# Patient Record
Sex: FEMALE | Race: WHITE | Employment: FULL TIME | URBAN - METROPOLITAN AREA
[De-identification: names, ages, dates, MRNs, and addresses within clinical notes are randomized per-mention and may not be internally consistent; named-entity substitution may affect disease eponyms.]

---

## 2022-05-16 LAB
ANTIBODY SCREEN OB: NEGATIVE
ANTIBODY SCREEN OB: NEGATIVE
HEPATITIS B SURFACE ANTIGEN OB: NEGATIVE
HEPATITIS B SURFACE ANTIGEN OB: NEGATIVE
HIV RESULT OB: NEGATIVE
HIV RESULT OB: NEGATIVE
RH FACTOR OB: POSITIVE
RH FACTOR OB: POSITIVE

## 2022-11-07 LAB
STREP GP B CULT OB: NEGATIVE
STREP GP B CULT OB: NEGATIVE

## 2022-11-16 ENCOUNTER — ULTRASOUND ENCOUNTER (OUTPATIENT)
Dept: PERINATAL CARE | Facility: HOSPITAL | Age: 32
End: 2022-11-16
Attending: OBSTETRICS & GYNECOLOGY
Payer: COMMERCIAL

## 2022-11-16 ENCOUNTER — ANESTHESIA (OUTPATIENT)
Dept: OBGYN UNIT | Facility: HOSPITAL | Age: 32
End: 2022-11-16
Payer: COMMERCIAL

## 2022-11-16 ENCOUNTER — APPOINTMENT (OUTPATIENT)
Dept: OBGYN CLINIC | Facility: HOSPITAL | Age: 32
End: 2022-11-16
Payer: COMMERCIAL

## 2022-11-16 ENCOUNTER — HOSPITAL ENCOUNTER (OUTPATIENT)
Facility: HOSPITAL | Age: 32
Discharge: HOME | End: 2022-11-16
Attending: OBSTETRICS & GYNECOLOGY | Admitting: OBSTETRICS & GYNECOLOGY
Payer: COMMERCIAL

## 2022-11-16 ENCOUNTER — ANESTHESIA EVENT (OUTPATIENT)
Dept: OBGYN UNIT | Facility: HOSPITAL | Age: 32
End: 2022-11-16
Payer: COMMERCIAL

## 2022-11-16 VITALS
BODY MASS INDEX: 24.49 KG/M2 | TEMPERATURE: 97 F | WEIGHT: 147 LBS | RESPIRATION RATE: 16 BRPM | HEIGHT: 65 IN | HEART RATE: 81 BPM | OXYGEN SATURATION: 98 % | SYSTOLIC BLOOD PRESSURE: 116 MMHG | DIASTOLIC BLOOD PRESSURE: 69 MMHG

## 2022-11-16 LAB
ERYTHROCYTE [DISTWIDTH] IN BLOOD BY AUTOMATED COUNT: 12.8 %
HCT VFR BLD AUTO: 39.2 %
HGB BLD-MCNC: 13.5 G/DL
HIV 1+2 AB+HIV1 P24 AG SERPL QL IA: NONREACTIVE
MCH RBC QN AUTO: 34.3 PG (ref 26–34)
MCHC RBC AUTO-ENTMCNC: 34.4 G/DL (ref 31–37)
MCV RBC AUTO: 99.5 FL
PLATELET # BLD AUTO: 200 10(3)UL (ref 150–450)
RBC # BLD AUTO: 3.94 X10(6)UL
T PALLIDUM AB SER QL IA: NONREACTIVE
WBC # BLD AUTO: 13.4 X10(3) UL (ref 4–11)

## 2022-11-16 PROCEDURE — 86780 TREPONEMA PALLIDUM: CPT | Performed by: OBSTETRICS & GYNECOLOGY

## 2022-11-16 PROCEDURE — 10S0XZZ REPOSITION PRODUCTS OF CONCEPTION, EXTERNAL APPROACH: ICD-10-PCS | Performed by: OBSTETRICS & GYNECOLOGY

## 2022-11-16 PROCEDURE — 85027 COMPLETE CBC AUTOMATED: CPT | Performed by: OBSTETRICS & GYNECOLOGY

## 2022-11-16 PROCEDURE — 96361 HYDRATE IV INFUSION ADD-ON: CPT

## 2022-11-16 PROCEDURE — 76815 OB US LIMITED FETUS(S): CPT | Performed by: OBSTETRICS & GYNECOLOGY

## 2022-11-16 PROCEDURE — 59025 FETAL NON-STRESS TEST: CPT

## 2022-11-16 PROCEDURE — 36415 COLL VENOUS BLD VENIPUNCTURE: CPT

## 2022-11-16 PROCEDURE — 96374 THER/PROPH/DIAG INJ IV PUSH: CPT

## 2022-11-16 PROCEDURE — 86701 HIV-1ANTIBODY: CPT | Performed by: OBSTETRICS & GYNECOLOGY

## 2022-11-16 PROCEDURE — 59412 ANTEPARTUM MANIPULATION: CPT

## 2022-11-16 PROCEDURE — 96372 THER/PROPH/DIAG INJ SC/IM: CPT

## 2022-11-16 RX ORDER — CHOLECALCIFEROL (VITAMIN D3) 25 MCG
1 TABLET,CHEWABLE ORAL DAILY
COMMUNITY

## 2022-11-16 RX ORDER — SODIUM CHLORIDE, SODIUM LACTATE, POTASSIUM CHLORIDE, CALCIUM CHLORIDE 600; 310; 30; 20 MG/100ML; MG/100ML; MG/100ML; MG/100ML
INJECTION, SOLUTION INTRAVENOUS CONTINUOUS
Status: DISCONTINUED | OUTPATIENT
Start: 2022-11-16 | End: 2022-11-16

## 2022-11-16 RX ORDER — TERBUTALINE SULFATE 1 MG/ML
0.25 INJECTION, SOLUTION SUBCUTANEOUS
Status: ACTIVE | OUTPATIENT
Start: 2022-11-16 | End: 2022-11-16

## 2022-11-16 RX ORDER — NALBUPHINE HCL 10 MG/ML
2.5 AMPUL (ML) INJECTION
Status: DISCONTINUED | OUTPATIENT
Start: 2022-11-16 | End: 2022-11-16

## 2022-11-16 RX ORDER — BUPIVACAINE HCL/0.9 % NACL/PF 0.25 %
5 PLASTIC BAG, INJECTION (ML) EPIDURAL AS NEEDED
Status: DISCONTINUED | OUTPATIENT
Start: 2022-11-16 | End: 2022-11-16

## 2022-11-16 RX ORDER — TERBUTALINE SULFATE 1 MG/ML
INJECTION, SOLUTION SUBCUTANEOUS
Status: COMPLETED
Start: 2022-11-16 | End: 2022-11-16

## 2022-11-16 NOTE — PROGRESS NOTES
Procedure note    Procedure: external cephalic version    Diagnosis breech presentation    Post procedure diagnosis cephalic presentation    Surgeon Dr. Jazmin Blackman MD    Assistant  Jocelyn Mathias    Epidural anesthesia was placed. Terbutaline was given subcutaneously. Patient was in the supine position. Upward pressure was used to move the baby up out of the pelvis while forward pressure moved the baby in a forward roll. 3 progressive attempts were made and successfully moved the baby to the phallic presentation. Cardiac activity was checked intermittently and noted to be a normal heart rate. Post procedure heart rate was in the 150s. Patient and fetus tolerated the procedure well.     Jazmin Blackman MD

## 2022-11-16 NOTE — DISCHARGE INSTRUCTIONS
Discharge Instructions    Diet: regular  Activity: Normal activity         General Instructions    Call your OB doctor if: Fluid leaking from your vagina; Decrease in fetal movement;Vaginal or rectal pressure;Vaginal bleeding;Uterine contractions 10 minutes or closer for 1 to 2 hours;Uterine contractions increasing in intensity and frequency; Temperature greater than 100F  Early labor comfort measures: Drink fluids and eat small light meals

## 2022-11-16 NOTE — PROGRESS NOTES
Patient was seen prior to the procedure and nst was reactive. Epidural was placed and subcutaneous terbutaline was given for one dose. She underwent the ECV with Dr. Kathie Stokes and myself assisting. Ultrasound was done intermittently to check position and heart tones. After the successful version, the patient was replaced on the monitor and will have monitoring for two hours. FHTs were wnl after the procedure and she was noted to have occasional contractions.

## 2022-11-16 NOTE — PROGRESS NOTES
Pt is a 33year old female admitted to 104/-A. Patient presents with:  External Version     Pt is  37w5d intra-uterine pregnancy. History obtained, consents signed. Oriented to room, staff, and plan of care.   Updated  on pt arrival and orders received for IV hydration and epidural

## 2022-11-16 NOTE — PROGRESS NOTES
[facility] ULTRASOUND REPORT   See imaging tab for complete consultation / ultrasound report   Ultrasound Findings:  Single IUP in breech presentation. Placenta is posterior. Cardiac activity is present at 129 bpm  MVP is 5.5 cm . ERIC 9.6 cm    Cardiac activity visualized after procedure. Fetus in cephalic presentation at end of procedure. Gila Mead MD      This was an ultrasound only encounter (no physician visit). The ultrasound was read by Dr. Twyla Nassar        Note to patient and family  The [facility] makes medical notes available to patients in the interest of transparency. However, please be advised that this is a medical document. It is intended as gyha-ea-oait communication. It is written and medical language may contain abbreviations or verbiage that are technical and unfamiliar. It may appear blunt or direct. Medical documents are intended to carry relevant information, facts as evident, and the clinical opinion of the practitioner.

## 2022-11-16 NOTE — ANESTHESIA PROCEDURE NOTES
Epidural Block    Date/Time: 11/16/2022 1:10 PM  Performed by: Jan Holm MD  Authorized by: Jan Holm MD     General Information and Staff    Start Time:  11/16/2022 1:10 PM  End Time:  11/16/2022 1:15 PM  Anesthesiologist:  Jan Holm MD  Performed by:   Anesthesiologist  Patient Location:  OB  Site Identification: surface landmarks    Reason for Block: at surgeon's request    Preanesthetic Checklist: patient identified, IV checked, risks and benefits discussed, surgical consent, monitors and equipment checked, pre-op evaluation, timeout performed, anesthesia consent and sterile technique used      Procedure Details    Patient Position:  Sitting  Prep: ChloraPrep    Monitoring:  Continuous pulse ox  Approach:  Midline  Location:  L 2-3  Injection Technique:  ALEXANDER air    Needle and Epidural Catheter    Needle Type:  Tuohy  Needle Gauge:  18 G  Needle Length:  3.5 in  ALEXANDER Depth:  4  Catheter Type:  End hole  Catheter Size:  20 G  Catheter at Skin Depth:  10  Test Dose:  Negative    Assessment    Sensory Level:  T6    Additional Comments     Test dose 3 mL 1.5% lido with Epi without sequela  Dosed with Lido PF 2% 5 mL + 5 mL + 3 mL  Fentanyl 100 mcg

## 2022-11-16 NOTE — PROGRESS NOTES
Pt walked to bathroom, voided, pericare done, abdominal binder applied, IV removed  epidural catheter removed, updated  orders received to discharge pt home with labor instruction and follow up for the next scheduled appointment with OB  Pt verbalizes discharge instruction, pt going home in stable condition, pt not in active labor on discharge.

## 2022-11-25 ENCOUNTER — HOSPITAL ENCOUNTER (OUTPATIENT)
Facility: HOSPITAL | Age: 32
Discharge: HOME | End: 2022-11-25
Attending: OBSTETRICS & GYNECOLOGY | Admitting: OBSTETRICS & GYNECOLOGY
Payer: COMMERCIAL

## 2022-11-25 ENCOUNTER — APPOINTMENT (OUTPATIENT)
Dept: ULTRASOUND IMAGING | Facility: HOSPITAL | Age: 32
End: 2022-11-25
Attending: OBSTETRICS & GYNECOLOGY
Payer: COMMERCIAL

## 2022-11-25 VITALS
BODY MASS INDEX: 24.16 KG/M2 | DIASTOLIC BLOOD PRESSURE: 75 MMHG | RESPIRATION RATE: 18 BRPM | TEMPERATURE: 98 F | WEIGHT: 145 LBS | HEIGHT: 65 IN | HEART RATE: 90 BPM | SYSTOLIC BLOOD PRESSURE: 120 MMHG

## 2022-11-25 PROCEDURE — 76815 OB US LIMITED FETUS(S): CPT | Performed by: OBSTETRICS & GYNECOLOGY

## 2022-11-25 PROCEDURE — 59025 FETAL NON-STRESS TEST: CPT

## 2022-11-25 PROCEDURE — 99213 OFFICE O/P EST LOW 20 MIN: CPT

## 2022-11-26 NOTE — NST
Nonstress Test   Patient: Melly Height    Gestation: 39w0d    NST:       Variability: Moderate           Accelerations: Yes           Decelerations: None            Baseline: 130 BPM           Uterine Irritability: No           Contractions: Irregular                                        Acoustic Stimulator: No           Nonstress Test Interpretation: Reactive                                 Additional Comments

## 2022-11-26 NOTE — DISCHARGE INSTRUCTIONS
Discharge Instructions    Diet: Regular diet/push fluids  Activity: Normal activity         General Instructions    Call your OB doctor if: Fluid leaking from your vagina; Decrease in fetal movement;Vaginal or rectal pressure;Uterine contractions 10 minutes or closer for 1 to 2 hours;Vaginal bleeding;Uterine contractions increasing in intensity and frequency; Temperature greater than 100F  Early labor comfort measures: Drink fluids and eat small light meals;Relax, sleep, take a warm bath or shower for 30 minutes or less; Take a walk

## 2022-11-26 NOTE — PROGRESS NOTES
Pt is a 33year old female admitted to TRG2/TRG2-A. Patient presents with:  R/o Labor: Patient reports UC every 5 mins x 2.5 hours. Denies LOF or bleeding.  + FM. Pt is  39w0d intra-uterine pregnancy. History obtained, consents signed. Oriented to room, staff, and plan of care.

## 2022-11-26 NOTE — PROGRESS NOTES
Patient up to walk x2 hours. Instructed not to leave labor and delivery. Patient will return to triage sooner with SROM, bleeding, or stronger more regular UC.

## 2022-12-01 ENCOUNTER — APPOINTMENT (OUTPATIENT)
Dept: OBGYN CLINIC | Facility: HOSPITAL | Age: 32
End: 2022-12-01
Payer: COMMERCIAL

## 2022-12-01 ENCOUNTER — HOSPITAL ENCOUNTER (INPATIENT)
Facility: HOSPITAL | Age: 32
LOS: 3 days | Discharge: HOME | End: 2022-12-04
Attending: OBSTETRICS & GYNECOLOGY | Admitting: OBSTETRICS & GYNECOLOGY
Payer: COMMERCIAL

## 2022-12-01 PROBLEM — Z34.90 PREGNANCY: Status: ACTIVE | Noted: 2022-12-01

## 2022-12-01 PROBLEM — Z34.90 PREGNANCY (HCC): Status: ACTIVE | Noted: 2022-12-01

## 2022-12-01 LAB
ANTIBODY SCREEN: NEGATIVE
BASOPHILS # BLD AUTO: 0.07 X10(3) UL (ref 0–0.2)
BASOPHILS NFR BLD AUTO: 0.6 %
EOSINOPHIL # BLD AUTO: 0.13 X10(3) UL (ref 0–0.7)
EOSINOPHIL NFR BLD AUTO: 1.1 %
ERYTHROCYTE [DISTWIDTH] IN BLOOD BY AUTOMATED COUNT: 12.8 %
HCT VFR BLD AUTO: 37.1 %
HGB BLD-MCNC: 12.8 G/DL
IMM GRANULOCYTES # BLD AUTO: 0.08 X10(3) UL (ref 0–1)
IMM GRANULOCYTES NFR BLD: 0.7 %
LYMPHOCYTES # BLD AUTO: 3.25 X10(3) UL (ref 1–4)
LYMPHOCYTES NFR BLD AUTO: 27.9 %
MCH RBC QN AUTO: 34 PG (ref 26–34)
MCHC RBC AUTO-ENTMCNC: 34.5 G/DL (ref 31–37)
MCV RBC AUTO: 98.4 FL
MONOCYTES # BLD AUTO: 0.91 X10(3) UL (ref 0.1–1)
MONOCYTES NFR BLD AUTO: 7.8 %
NEUTROPHILS # BLD AUTO: 7.21 X10 (3) UL (ref 1.5–7.7)
NEUTROPHILS # BLD AUTO: 7.21 X10(3) UL (ref 1.5–7.7)
NEUTROPHILS NFR BLD AUTO: 61.9 %
PLATELET # BLD AUTO: 214 10(3)UL (ref 150–450)
RBC # BLD AUTO: 3.77 X10(6)UL
RH BLOOD TYPE: POSITIVE
SARS-COV-2 RNA RESP QL NAA+PROBE: NOT DETECTED
T PALLIDUM AB SER QL IA: NONREACTIVE
WBC # BLD AUTO: 11.7 X10(3) UL (ref 4–11)

## 2022-12-01 PROCEDURE — 82728 ASSAY OF FERRITIN: CPT | Performed by: STUDENT IN AN ORGANIZED HEALTH CARE EDUCATION/TRAINING PROGRAM

## 2022-12-01 PROCEDURE — 85025 COMPLETE CBC W/AUTO DIFF WBC: CPT | Performed by: OBSTETRICS & GYNECOLOGY

## 2022-12-01 PROCEDURE — 86901 BLOOD TYPING SEROLOGIC RH(D): CPT | Performed by: OBSTETRICS & GYNECOLOGY

## 2022-12-01 PROCEDURE — 86780 TREPONEMA PALLIDUM: CPT | Performed by: OBSTETRICS & GYNECOLOGY

## 2022-12-01 PROCEDURE — 3E0DXGC INTRODUCTION OF OTHER THERAPEUTIC SUBSTANCE INTO MOUTH AND PHARYNX, EXTERNAL APPROACH: ICD-10-PCS | Performed by: STUDENT IN AN ORGANIZED HEALTH CARE EDUCATION/TRAINING PROGRAM

## 2022-12-01 PROCEDURE — 3E0P7VZ INTRODUCTION OF HORMONE INTO FEMALE REPRODUCTIVE, VIA NATURAL OR ARTIFICIAL OPENING: ICD-10-PCS | Performed by: STUDENT IN AN ORGANIZED HEALTH CARE EDUCATION/TRAINING PROGRAM

## 2022-12-01 PROCEDURE — 86850 RBC ANTIBODY SCREEN: CPT | Performed by: OBSTETRICS & GYNECOLOGY

## 2022-12-01 PROCEDURE — 86900 BLOOD TYPING SEROLOGIC ABO: CPT | Performed by: OBSTETRICS & GYNECOLOGY

## 2022-12-01 RX ORDER — HYDROMORPHONE HYDROCHLORIDE 1 MG/ML
1 INJECTION, SOLUTION INTRAMUSCULAR; INTRAVENOUS; SUBCUTANEOUS EVERY 2 HOUR PRN
Status: DISCONTINUED | OUTPATIENT
Start: 2022-12-01 | End: 2022-12-02

## 2022-12-01 RX ORDER — ZOLPIDEM TARTRATE 5 MG/1
5 TABLET ORAL NIGHTLY PRN
Status: DISCONTINUED | OUTPATIENT
Start: 2022-12-01 | End: 2022-12-02 | Stop reason: HOSPADM

## 2022-12-01 RX ORDER — DEXTROSE, SODIUM CHLORIDE, SODIUM LACTATE, POTASSIUM CHLORIDE, AND CALCIUM CHLORIDE 5; .6; .31; .03; .02 G/100ML; G/100ML; G/100ML; G/100ML; G/100ML
INJECTION, SOLUTION INTRAVENOUS AS NEEDED
Status: DISCONTINUED | OUTPATIENT
Start: 2022-12-01 | End: 2022-12-02 | Stop reason: HOSPADM

## 2022-12-01 RX ORDER — ONDANSETRON 2 MG/ML
4 INJECTION INTRAMUSCULAR; INTRAVENOUS EVERY 6 HOURS PRN
Status: DISCONTINUED | OUTPATIENT
Start: 2022-12-01 | End: 2022-12-02 | Stop reason: HOSPADM

## 2022-12-01 RX ORDER — TRISODIUM CITRATE DIHYDRATE AND CITRIC ACID MONOHYDRATE 500; 334 MG/5ML; MG/5ML
30 SOLUTION ORAL AS NEEDED
Status: DISCONTINUED | OUTPATIENT
Start: 2022-12-01 | End: 2022-12-02 | Stop reason: HOSPADM

## 2022-12-01 RX ORDER — SODIUM CHLORIDE, SODIUM LACTATE, POTASSIUM CHLORIDE, CALCIUM CHLORIDE 600; 310; 30; 20 MG/100ML; MG/100ML; MG/100ML; MG/100ML
INJECTION, SOLUTION INTRAVENOUS CONTINUOUS
Status: DISCONTINUED | OUTPATIENT
Start: 2022-12-01 | End: 2022-12-02 | Stop reason: HOSPADM

## 2022-12-01 RX ORDER — ACETAMINOPHEN 500 MG
500 TABLET ORAL EVERY 6 HOURS PRN
Status: DISCONTINUED | OUTPATIENT
Start: 2022-12-01 | End: 2022-12-02 | Stop reason: HOSPADM

## 2022-12-01 RX ORDER — AMMONIA INHALANTS 0.04 G/.3ML
0.3 INHALANT RESPIRATORY (INHALATION) AS NEEDED
Status: DISCONTINUED | OUTPATIENT
Start: 2022-12-01 | End: 2022-12-02 | Stop reason: HOSPADM

## 2022-12-01 RX ORDER — IBUPROFEN 600 MG/1
600 TABLET ORAL EVERY 6 HOURS PRN
Status: DISCONTINUED | OUTPATIENT
Start: 2022-12-01 | End: 2022-12-02 | Stop reason: HOSPADM

## 2022-12-01 RX ORDER — TERBUTALINE SULFATE 1 MG/ML
0.25 INJECTION, SOLUTION SUBCUTANEOUS AS NEEDED
Status: DISCONTINUED | OUTPATIENT
Start: 2022-12-01 | End: 2022-12-02 | Stop reason: HOSPADM

## 2022-12-02 ENCOUNTER — ANESTHESIA (OUTPATIENT)
Dept: OBGYN UNIT | Facility: HOSPITAL | Age: 32
End: 2022-12-02
Payer: COMMERCIAL

## 2022-12-02 ENCOUNTER — ANESTHESIA EVENT (OUTPATIENT)
Dept: OBGYN UNIT | Facility: HOSPITAL | Age: 32
End: 2022-12-02
Payer: COMMERCIAL

## 2022-12-02 PROCEDURE — 0KQM0ZZ REPAIR PERINEUM MUSCLE, OPEN APPROACH: ICD-10-PCS | Performed by: STUDENT IN AN ORGANIZED HEALTH CARE EDUCATION/TRAINING PROGRAM

## 2022-12-02 RX ORDER — ACETAMINOPHEN 500 MG
1000 TABLET ORAL EVERY 6 HOURS PRN
Status: DISCONTINUED | OUTPATIENT
Start: 2022-12-02 | End: 2022-12-04

## 2022-12-02 RX ORDER — BUPIVACAINE HCL/0.9 % NACL/PF 0.25 %
5 PLASTIC BAG, INJECTION (ML) EPIDURAL AS NEEDED
Status: DISCONTINUED | OUTPATIENT
Start: 2022-12-02 | End: 2022-12-02

## 2022-12-02 RX ORDER — SIMETHICONE 80 MG
80 TABLET,CHEWABLE ORAL 3 TIMES DAILY PRN
Status: DISCONTINUED | OUTPATIENT
Start: 2022-12-02 | End: 2022-12-04

## 2022-12-02 RX ORDER — LIDOCAINE HYDROCHLORIDE AND EPINEPHRINE 15; 5 MG/ML; UG/ML
INJECTION, SOLUTION EPIDURAL AS NEEDED
Status: DISCONTINUED | OUTPATIENT
Start: 2022-12-02 | End: 2022-12-02 | Stop reason: SURG

## 2022-12-02 RX ORDER — NALBUPHINE HCL 10 MG/ML
2.5 AMPUL (ML) INJECTION
Status: DISCONTINUED | OUTPATIENT
Start: 2022-12-02 | End: 2022-12-02

## 2022-12-02 RX ORDER — DOCUSATE SODIUM 100 MG/1
100 CAPSULE, LIQUID FILLED ORAL
Status: DISCONTINUED | OUTPATIENT
Start: 2022-12-02 | End: 2022-12-04

## 2022-12-02 RX ORDER — CARBOPROST TROMETHAMINE 250 UG/ML
INJECTION, SOLUTION INTRAMUSCULAR
Status: COMPLETED
Start: 2022-12-02 | End: 2022-12-02

## 2022-12-02 RX ORDER — METHYLERGONOVINE MALEATE 0.2 MG/ML
INJECTION INTRAVENOUS
Status: COMPLETED
Start: 2022-12-02 | End: 2022-12-02

## 2022-12-02 RX ORDER — MISOPROSTOL 200 UG/1
TABLET ORAL
Status: DISCONTINUED
Start: 2022-12-02 | End: 2022-12-02 | Stop reason: WASHOUT

## 2022-12-02 RX ORDER — ACETAMINOPHEN 500 MG
500 TABLET ORAL EVERY 6 HOURS PRN
Status: DISCONTINUED | OUTPATIENT
Start: 2022-12-02 | End: 2022-12-04

## 2022-12-02 RX ORDER — BISACODYL 10 MG
10 SUPPOSITORY, RECTAL RECTAL ONCE AS NEEDED
Status: DISCONTINUED | OUTPATIENT
Start: 2022-12-02 | End: 2022-12-04

## 2022-12-02 RX ORDER — IBUPROFEN 600 MG/1
600 TABLET ORAL EVERY 6 HOURS
Status: DISCONTINUED | OUTPATIENT
Start: 2022-12-02 | End: 2022-12-04

## 2022-12-02 RX ORDER — METHYLERGONOVINE MALEATE 0.2 MG/ML
0.2 INJECTION INTRAVENOUS ONCE
Status: COMPLETED | OUTPATIENT
Start: 2022-12-02 | End: 2022-12-02

## 2022-12-02 RX ADMIN — LIDOCAINE HYDROCHLORIDE AND EPINEPHRINE 5 ML: 15; 5 INJECTION, SOLUTION EPIDURAL at 03:00:00

## 2022-12-02 NOTE — L&D DELIVERY NOTE
Deepak Alvarez [[id number]]    Labor Events     labor?: No   steroids?: None  Cervical ripening type: Misoprostol  Antibiotics received during labor?: No  Antibiotics (enter # doses in comment): none  Rupture date/time: 2022     Rupture type: SROM  Fluid color: Clear  Induction: Misoprostol  Indications for induction: Elective  Augmentation: None  Intrapartum & labor complications: Variable decelerations     Labor Length    1st stage: 10h 35m  2nd stage: 0h 59m  3rd stage: 0h 06m     Labor Event Times    Labor onset date/time: 2022  Dilation complete date/time: 2022 1200  Start pushing date/time: 2022 1205      Presentation    Presentation: Vertex  Position: Left Occiput Anterior     Operative Delivery    Operative Vaginal Delivery: No            Shoulder Dystocia    Shoulder Dystocia: No     Anesthesia    Method: Epidural          [place] Delivery    Delivery date/time:  22 12:59:00   Delivery type: Normal spontaneous vaginal delivery    Details:     Delivery location: delivery room     Delivery Providers    Delivering Clinician: Quinn Simmons MD   Delivery personnel:  Provider Role   Williams Novoa, RN Baby Nurse   Samantha Mariscal, RN Delivery Nurse   Carlee Choudhary, RN Baby Nurse         Cord    Vessels: 3 Vessels  Complications: Nuchal  # of loops: 1  Timed cord clamping: Yes  Time in sec: 39  Cord blood disposition: to lab  Gases sent?: No     Resuscitation    Method: None     [place] Measurements    Weight: 3800 g 8 lb 6 oz Length: 50.8 cm   Head circum. : 35 cm Chest circum.: 33 cm      Abdominal circum.: 32 cm       Placenta    Date/time: 2022 1305  Removal: Expressed  Appearance: Intact  Disposition: held for future pathology     Apgars    Living status: Living   Apgar Scoring Key:    0 1 2    Skin color Blue or pale Acrocyanotic Completely pink    Heart rate Absent <100 bpm >100 bpm    Reflex irritability No response Grimace Cry or active withdrawal    Muscle tone Limp Some flexion Active motion    Respiratory effort Absent Weak cry; hypoventilation Good, crying              1 Minute:  5 Minute:  10 Minute:  15 Minute:  20 Minute:    Skin color: 0  1       Heart rate: 2  2       Reflex irritablity: 2  2       Muscle tone: 2  2       Respiratory effort: 2  2       Total: 8  9          Apgars assigned by: Priya Hdz RN  [place] disposition: with mother     Skin to Skin    Skin to skin initiated date/time: 2022 1315  Skin to skin with: Mother     Vaginal Count    Initial count RN: Yo Rabago RN  Initial count Tech: Gracia Benes   Sponges   Sharps    Initial counts 11   0    Final counts 11   2    Final count RN: Loralee Epley, RN  Final count MD: Adina Cantu MD     Delivery (Maternal)    Episiotomy: Left Mediolateral  Indications for episiotomy: Fetal Intolerance of Labor  Perineal lacerations: 2nd Repaired?: Yes   Vaginal laceration?: No    Cervical laceration?: No    Clitoral laceration?: No    Quantitative blood loss (mL): 470                                                                  Vaginal Delivery Note          Cade Wynn Patient Status:  Inpatient    1990 MRN [id number]   Location [address] [place] [address] Attending Orquidea Gray MD, MD   Hosp Day # 1 PCP No primary care provider on file. Preprocedure Dx:  IUP at 40w0d, s/p successful external cephalic version at 46B0B, elective induction of labor    Post Procedure Dx: Same - delivered    Procedure: Normal Spontaneous Vaginal Delivery    Physician: Felipe Pelaez MD    Anesthesia: Epidural and local    QBL: 470cc    Findings:   1) A viable male infant with Apgars of 8 and 9 weighing 8lbs 6 oz was delivered in ROBYN position with a loose nuchal cord. 2) 3 vessel cord. 3) Normal appearing placenta spontaneously delivered intact.    4) small LML episiotomy cut due to low fetal heart rate degree laceration    Procedure:  Patient is a 32 y/o  who presented at 39w6d gestation for elective induction of labor. Patient was admitted to labor and delivery. Misoprostol was given for cervical ripening. SROM occurred at 0125 with clear fluid. Epidural was placed per patient request, and she was managed expectantly after that. Intermittent prolonged and late decelerations were noted. The fetal tracing improved with positioning. Her labor progressed, and upon complete dilation she had a strong urge to push. The patient pushed for less than 1 hour. With pushing the fetal heart rate dropped to 70s to 90s. When the fetal heart rate did not return above 100bpm between pushes episiotomy was discussed. The patient consented to episiotomy. LML episiotomy was cut approximately 1.5 cm in length distal to the hymenal ring, and the fetal head delivered with the following two pushes. As the head was delivered, the perineum was supported to decrease the risk of further tearing. The infant was delivered in the ROBYN position with a loose nuchal cord around the back of the neck only. The shoulders rotated easily, and the anterior shoulder delivered easily followed by the posterior shoulder and remainder of the infant. The infant's mouth and nose were bulb suctioned. The cord was doubly clamped and cut after a 30 second delay. The baby was placed on the mother's abdomen vigorous and crying. The cord blood was collected, and the placenta spontaneously delivered intact shortly thereafter. Bimanual exam was performed to the fundus. Retained products were not palpated. The lower uterine segment was atonic. Methergine and pitocin were given with improve tone. Examination of the cervix, vagina, and perineum demonstrated a 2nd degree laceration. The vaginal portion of the laceration was repaired using 2-0 vicryl in a running locked fashion from the apex of the tear to the level of the hymenal ring.   A deep crown stitch was placed, and the perineal body was re-approximated using 2-0 vicryl in an running fashion. The skin was re-approximated using 2-0 vicryl in a subcuticular fashion. Bleeding was minimal. The patient was then moved to the supine position in stable condition. Counts were correct.     Complications:  None    Russ Kaur MD  12/2/2022  1:40 PM

## 2022-12-02 NOTE — PLAN OF CARE
Problem: SAFETY ADULT - FALL  Goal: Free from fall injury  Description: INTERVENTIONS:  - Assess pt frequently for physical needs  - Identify cognitive and physical deficits and behaviors that affect risk of falls.   - [place] fall precautions as indicated by assessment.  - Educate pt/family on patient safety including physical limitations  - Instruct pt to call for assistance with activity based on assessment  - Modify environment to reduce risk of injury  - Provide assistive devices as appropriate  - Consider OT/PT consult to assist with strengthening/mobility  - Encourage toileting schedule  2022 1302 by Larisa Baird RN  Outcome: Progressing  2022 07 by Larisa Baird RN  Outcome: Progressing     Problem: BIRTH - VAGINAL/ SECTION  Goal: Fetal and maternal status remain reassuring during the birth process  Description: INTERVENTIONS:  - Monitor vital signs  - Monitor fetal heart rate  - Monitor uterine activity  - Monitor labor progression (vaginal delivery)  - DVT prophylaxis (C/S delivery)  - Surgical antibiotic prophylaxis (C/S delivery)  2022 1302 by Larisa Baird RN  Outcome: Completed  2022 by Larisa Baird RN  Outcome: Progressing     Problem: PAIN - ADULT  Goal: Verbalizes/displays adequate comfort level or patient's stated pain goal  Description: INTERVENTIONS:  - Encourage pt to monitor pain and request assistance  - Assess pain using appropriate pain scale  - Administer analgesics based on type and severity of pain and evaluate response  - Implement non-pharmacological measures as appropriate and evaluate response  - Consider cultural and social influences on pain and pain management  - Manage/alleviate anxiety  - Utilize distraction and/or relaxation techniques  - Monitor for opioid side effects  - Notify MD/LIP if interventions unsuccessful or patient reports new pain  - Anticipate increased pain with activity and pre-medicate as appropriate  2022 1302 by Dariana Morris RN  Outcome: Completed  12/2/2022 0738 by Dariana Morris RN  Outcome: Progressing     Problem: ANXIETY  Goal: Will report anxiety at manageable levels  Description: INTERVENTIONS:  - Administer medication as ordered  - Teach and rehearse alternative coping skills  - Provide emotional support with 1:1 interaction with staff  12/2/2022 1302 by Dariana Morris RN  Outcome: Completed  12/2/2022 0738 by Dariana Morris RN  Outcome: Progressing     Problem: Patient/Family Goals  Goal: Patient/Family Long Term Goal  Description: Patient's Long Term Goal: uncomplicated delivery     Interventions:  -   - See additional Care Plan goals for specific interventions  12/2/2022 1302 by Dariana Morris RN  Outcome: Completed  12/2/2022 0738 by Dariana Morris RN  Outcome: Progressing  Goal: Patient/Family Short Term Goal  Description: Patient's Short Term Goal: pain control     Interventions:   -   - See additional Care Plan goals for specific interventions  12/2/2022 1302 by Dariana Morris RN  Outcome: Completed  12/2/2022 0738 by Dariana Morris RN  Outcome: Progressing

## 2022-12-02 NOTE — ANESTHESIA PROCEDURE NOTES
Labor Analgesia    Date/Time: 12/2/2022 3:00 AM  Performed by: Cande Frank MD  Authorized by: Cande Frank MD       General Information and Staff    Start Time:  12/2/2022 3:00 AM  End Time:  12/2/2022 3:07 AM  Anesthesiologist:  Cande Frank MD  Performed by:   Anesthesiologist  Patient Location:  OB  Site Identification: surface landmarks    Reason for Block: labor epidural    Preanesthetic Checklist: patient identified, IV checked, risks and benefits discussed, monitors and equipment checked, pre-op evaluation, timeout performed, IV bolus, anesthesia consent and sterile technique used      Procedure Details    Patient Position:  Sitting  Prep: ChloraPrep    Monitoring:  Heart rate and continuous pulse ox  Approach:  Midline    Epidural Needle    Injection Technique:  ALEXANDER saline  Needle Type:  Tuohy  Needle Gauge:  17 G  Needle Length:  3.375 in  Needle Insertion Depth:  4  Location:  L3-4    Spinal Needle      Catheter    Catheter Type:  End hole  Catheter Size:  19 G  Catheter at Skin Depth:  12  Test Dose:  Negative    Assessment    Sensory Level:  T6    Additional Comments     Test Dose Given at 0307 am

## 2022-12-02 NOTE — PROGRESS NOTES
Pt is a 33year old female admitted to 103/103-A, No chief complaint on file. Pt here for elective IOL     Pt is 39w6d intra-uterine pregnancy. Denies any leaking of fluid. Reports +fetal movement. History obtained, consents signed. Oriented to room, staff, and plan of care.

## 2022-12-02 NOTE — PROGRESS NOTES
Fetal tracing evaluated due to minimal variability and periods of fetal heart rate decelerations. I spoke  I examined her and she is 5-6/80/-1  After exam there was acceleration of FHR to the 160s, baseline has been in the 150s. Patient was repositioned to sitting up and variability improved to moderate with periods of accelerations. Will continue careful observation at this time. Plan discussed with the patient and .

## 2022-12-02 NOTE — PROGRESS NOTES
Pt admitted to room 1110 in stable condition. HUGS/KISSES in place. ID bands verified with Bi Yu RN. Educational materials reviewed and placed at bedside. All questions answered. Call light within reach.

## 2022-12-02 NOTE — PROGRESS NOTES
Patient up to bathroom with assist x 2. Voided 800 at this time. Patient transferred to mother/baby room 1110 per wheelchair in stable condition with baby and personal belongings. Accompanied by significant other and staff. Report given to mother/baby RN.

## 2022-12-02 NOTE — PLAN OF CARE
Problem: BIRTH - VAGINAL/ SECTION  Goal: Fetal and maternal status remain reassuring during the birth process  Description: INTERVENTIONS:  - Monitor vital signs  - Monitor fetal heart rate  - Monitor uterine activity  - Monitor labor progression (vaginal delivery)  - DVT prophylaxis (C/S delivery)  - Surgical antibiotic prophylaxis (C/S delivery)  Outcome: Progressing     Problem: PAIN - ADULT  Goal: Verbalizes/displays adequate comfort level or patient's stated pain goal  Description: INTERVENTIONS:  - Encourage pt to monitor pain and request assistance  - Assess pain using appropriate pain scale  - Administer analgesics based on type and severity of pain and evaluate response  - Implement non-pharmacological measures as appropriate and evaluate response  - Consider cultural and social influences on pain and pain management  - Manage/alleviate anxiety  - Utilize distraction and/or relaxation techniques  - Monitor for opioid side effects  - Notify MD/LIP if interventions unsuccessful or patient reports new pain  - Anticipate increased pain with activity and pre-medicate as appropriate  Outcome: Progressing     Problem: ANXIETY  Goal: Will report anxiety at manageable levels  Description: INTERVENTIONS:  - Administer medication as ordered  - Teach and rehearse alternative coping skills  - Provide emotional support with 1:1 interaction with staff  Outcome: Progressing     Problem: SAFETY ADULT - FALL  Goal: Free from fall injury  Description: INTERVENTIONS:  - Assess pt frequently for physical needs  - Identify cognitive and physical deficits and behaviors that affect risk of falls.   - [place] fall precautions as indicated by assessment.  - Educate pt/family on patient safety including physical limitations  - Instruct pt to call for assistance with activity based on assessment  - Modify environment to reduce risk of injury  - Provide assistive devices as appropriate  - Consider OT/PT consult to assist with strengthening/mobility  - Encourage toileting schedule  Outcome: Progressing     Problem: Patient/Family Goals  Goal: Patient/Family Long Term Goal  Description: Patient's Long Term Goal: uncomplicated delivery     Interventions:  -   - See additional Care Plan goals for specific interventions  Outcome: Progressing  Goal: Patient/Family Short Term Goal  Description: Patient's Short Term Goal: pain control     Interventions:   -   - See additional Care Plan goals for specific interventions  Outcome: Progressing

## 2022-12-03 PROBLEM — Z34.90 PREGNANCY (HCC): Status: RESOLVED | Noted: 2022-12-01 | Resolved: 2022-12-03

## 2022-12-03 PROBLEM — Z34.90 PREGNANCY: Status: RESOLVED | Noted: 2022-12-01 | Resolved: 2022-12-03

## 2022-12-03 LAB
BASOPHILS # BLD AUTO: 0.09 X10(3) UL (ref 0–0.2)
BASOPHILS NFR BLD AUTO: 0.5 %
DEPRECATED HBV CORE AB SER IA-ACNC: 21.9 NG/ML
EOSINOPHIL # BLD AUTO: 0.09 X10(3) UL (ref 0–0.7)
EOSINOPHIL NFR BLD AUTO: 0.5 %
ERYTHROCYTE [DISTWIDTH] IN BLOOD BY AUTOMATED COUNT: 13.1 %
HCT VFR BLD AUTO: 31.3 %
HGB BLD-MCNC: 10.6 G/DL
IMM GRANULOCYTES # BLD AUTO: 0.12 X10(3) UL (ref 0–1)
IMM GRANULOCYTES NFR BLD: 0.7 %
LYMPHOCYTES # BLD AUTO: 2.67 X10(3) UL (ref 1–4)
LYMPHOCYTES NFR BLD AUTO: 16 %
MCH RBC QN AUTO: 33.7 PG (ref 26–34)
MCHC RBC AUTO-ENTMCNC: 33.9 G/DL (ref 31–37)
MCV RBC AUTO: 99.4 FL
MONOCYTES # BLD AUTO: 0.99 X10(3) UL (ref 0.1–1)
MONOCYTES NFR BLD AUTO: 5.9 %
NEUTROPHILS # BLD AUTO: 12.72 X10 (3) UL (ref 1.5–7.7)
NEUTROPHILS # BLD AUTO: 12.72 X10(3) UL (ref 1.5–7.7)
NEUTROPHILS NFR BLD AUTO: 76.4 %
PLATELET # BLD AUTO: 163 10(3)UL (ref 150–450)
RBC # BLD AUTO: 3.15 X10(6)UL
WBC # BLD AUTO: 16.7 X10(3) UL (ref 4–11)

## 2022-12-03 PROCEDURE — 85025 COMPLETE CBC W/AUTO DIFF WBC: CPT | Performed by: STUDENT IN AN ORGANIZED HEALTH CARE EDUCATION/TRAINING PROGRAM

## 2022-12-03 NOTE — PLAN OF CARE
Problem: SAFETY ADULT - FALL  Goal: Free from fall injury  Description: INTERVENTIONS:  - Assess pt frequently for physical needs  - Identify cognitive and physical deficits and behaviors that affect risk of falls. - [place] fall precautions as indicated by assessment.  - Educate pt/family on patient safety including physical limitations  - Instruct pt to call for assistance with activity based on assessment  - Modify environment to reduce risk of injury  - Provide assistive devices as appropriate  - Consider OT/PT consult to assist with strengthening/mobility  - Encourage toileting schedule  Outcome: Progressing     Problem: POSTPARTUM  Goal: Long Term Goal:Experiences normal postpartum course  Description: INTERVENTIONS:  - Assess and monitor vital signs and lab values. - Assess fundus and lochia. - Provide ice/sitz baths for perineum discomfort. - Monitor healing of incision/episiotomy/laceration, and assess for signs and symptoms of infection and hematoma. - Assess bladder function and monitor for bladder distention.  - Provide/instruct/assist with pericare as needed. - Provide VTE prophylaxis as needed. - Monitor bowel function.  - Encourage ambulation and provide assistance as needed. - Assess and monitor emotional status and provide social service/psych resources as needed. - Utilize standard precautions and use personal protective equipment as indicated. Ensure aseptic care of all intravenous lines and invasive tubes/drains.  - Obtain immunization and exposure to communicable diseases history. Outcome: Progressing  Goal: Optimize infant feeding at the breast  Description: INTERVENTIONS:  - Initiate breast feeding within first hour after birth. - Monitor effectiveness of current breast feeding efforts. - Assess support systems available to mother/family.  - Identify cultural beliefs/practices regarding lactation, letdown techniques, maternal food preferences.   - Assess mother's knowledge and previous experience with breast feeding.  - Provide information as needed about early infant feeding cues (e.g., rooting, lip smacking, sucking fingers/hand) versus late cue of crying.  - Discuss/demonstrate breast feeding aids (e.g., infant sling, nursing footstool/pillows, and breast pumps). - Encourage mother/other family members to express feelings/concerns, and actively listen. - Educate father/SO about benefits of breast feeding and how to manage common lactation challenges. - Recommend avoidance of specific medications or substances incompatible with breast feeding.  - Assess and monitor for signs of nipple pain/trauma. - Instruct and provide assistance with proper latch. - Review techniques for milk expression (breast pumping) and storage of breast milk. Provide pumping equipment/supplies, instructions and assistance, as needed. - Encourage rooming-in and breast feeding on demand.  - Encourage skin-to-skin contact. - Provide LC support as needed. - Assess for and manage engorgement. - Provide breast feeding education handouts and information on community breast feeding support. Outcome: Progressing  Goal: Establishment of adequate milk supply with medication/procedure interruptions  Description: INTERVENTIONS:  - Review techniques for milk expression (breast pumping). - Provide pumping equipment/supplies, instructions, and assistance until it is safe to breastfeed infant. Outcome: Progressing  Goal: Appropriate maternal -  bonding  Description: INTERVENTIONS:  - Assess caregiver- interactions. - Assess caregiver's emotional status and coping mechanisms. - Encourage caregiver to participate in  daily care. - Assess support systems available to mother/family.  - Provide /case management support as needed.   Outcome: Progressing

## 2022-12-04 VITALS
SYSTOLIC BLOOD PRESSURE: 119 MMHG | BODY MASS INDEX: 24.99 KG/M2 | WEIGHT: 150 LBS | HEIGHT: 65 IN | DIASTOLIC BLOOD PRESSURE: 83 MMHG | HEART RATE: 63 BPM | OXYGEN SATURATION: 99 % | TEMPERATURE: 99 F | RESPIRATION RATE: 18 BRPM

## 2022-12-04 NOTE — DISCHARGE SUMMARY
Obstetrical Discharge Summary    Patient Name:  Davonte Guevara  MRN:                 [id number]  YOB: 1990    Primary OB Clinician: Yung Parson 13    Admission Date: 2022    Discharge Date:  2022    Admission Diagnosis: 28yo  at 39w6d here for elective induction of labor    Discharge Diagnoses: 33year old  s/p vaginal delivery    Antepartum complications:   Breech presentation s/p successful external cephalic version    Intrapartum complications: None    Delivery: spontaneous vaginal delivery at 40w0d      Baby: [address] [place] [address] Course: The patient was admitted to L&D on 22 for cervical ripening. Her labor progressed spontaneously after Misoprostol was started. Epidural was placed for pain management per patient's request. The patient progressed well through her labor. On 22 at 1259, pt underwent uneventful spontaneous vaginal delivery with viable male infant. Apgars 8/9, weight 8 # 6 oz. Please see delivery note for details. Overall, pt did well in post-partum period. On postpartum day 1, patient was doing well. Pain was well-controlled. Lochia decreasing. She was tolerating a general diet. She was breast feeding baby. Vital signs were stable. Physical exam was within normal limits. The patient continued to meet postpartum expectations. Pt was discharged home on post-partum day 2 on 2022. Recent Labs:  Recent Labs   Lab 22  0755   RBC 3.77* 3.15*   HGB 12.8 10.6*   HCT 37.1 31.3*   MCV 98.4 99.4   MCH 34.0 33.7   MCHC 34.5 33.9   RDW 12.8 13.1   NEPRELIM 7.21 12.72*   WBC 11.7* 16.7*   .0 163.0       Discharge condition:  D/C'ed home in stable condition    Discharge diet:  general    Discharge medications:       Medication List      CONTINUE taking these medications    prenatal vitamin with DHA 27-0.8-228 MG Caps     UNISOM SLEEPTABS OR              Follow up:  Follow-up in the office in 6 weeks. Activity Restrictions:  Nothing per vagina for 6 weeks (no sex, tampons or douching)    Pt understood all instructions and was given copy on discharge home.      Junior Prasad MD  12/4/2022

## 2022-12-04 NOTE — PLAN OF CARE
Problem: SAFETY ADULT - FALL  Goal: Free from fall injury  Description: INTERVENTIONS:  - Assess pt frequently for physical needs  - Identify cognitive and physical deficits and behaviors that affect risk of falls. - [place] fall precautions as indicated by assessment.  - Educate pt/family on patient safety including physical limitations  - Instruct pt to call for assistance with activity based on assessment  - Modify environment to reduce risk of injury  - Provide assistive devices as appropriate  - Consider OT/PT consult to assist with strengthening/mobility  - Encourage toileting schedule  Outcome: Completed     Problem: POSTPARTUM  Goal: Long Term Goal:Experiences normal postpartum course  Description: INTERVENTIONS:  - Assess and monitor vital signs and lab values. - Assess fundus and lochia. - Provide ice/sitz baths for perineum discomfort. - Monitor healing of incision/episiotomy/laceration, and assess for signs and symptoms of infection and hematoma. - Assess bladder function and monitor for bladder distention.  - Provide/instruct/assist with pericare as needed. - Provide VTE prophylaxis as needed. - Monitor bowel function.  - Encourage ambulation and provide assistance as needed. - Assess and monitor emotional status and provide social service/psych resources as needed. - Utilize standard precautions and use personal protective equipment as indicated. Ensure aseptic care of all intravenous lines and invasive tubes/drains.  - Obtain immunization and exposure to communicable diseases history. Outcome: Completed  Goal: Optimize infant feeding at the breast  Description: INTERVENTIONS:  - Initiate breast feeding within first hour after birth. - Monitor effectiveness of current breast feeding efforts. - Assess support systems available to mother/family.  - Identify cultural beliefs/practices regarding lactation, letdown techniques, maternal food preferences.   - Assess mother's knowledge and previous experience with breast feeding.  - Provide information as needed about early infant feeding cues (e.g., rooting, lip smacking, sucking fingers/hand) versus late cue of crying.  - Discuss/demonstrate breast feeding aids (e.g., infant sling, nursing footstool/pillows, and breast pumps). - Encourage mother/other family members to express feelings/concerns, and actively listen. - Educate father/SO about benefits of breast feeding and how to manage common lactation challenges. - Recommend avoidance of specific medications or substances incompatible with breast feeding.  - Assess and monitor for signs of nipple pain/trauma. - Instruct and provide assistance with proper latch. - Review techniques for milk expression (breast pumping) and storage of breast milk. Provide pumping equipment/supplies, instructions and assistance, as needed. - Encourage rooming-in and breast feeding on demand.  - Encourage skin-to-skin contact. - Provide LC support as needed. - Assess for and manage engorgement. - Provide breast feeding education handouts and information on community breast feeding support. Outcome: Completed  Goal: Establishment of adequate milk supply with medication/procedure interruptions  Description: INTERVENTIONS:  - Review techniques for milk expression (breast pumping). - Provide pumping equipment/supplies, instructions, and assistance until it is safe to breastfeed infant. Outcome: Completed  Goal: Appropriate maternal -  bonding  Description: INTERVENTIONS:  - Assess caregiver- interactions. - Assess caregiver's emotional status and coping mechanisms. - Encourage caregiver to participate in  daily care. - Assess support systems available to mother/family.  - Provide /case management support as needed.   Outcome: Completed

## 2022-12-04 NOTE — PROGRESS NOTES
NURSING DISCHARGE NOTE    Discharged Home via Ambulatory. Accompanied by Spouse  Belongings Taken by patient/family Verbalized good understanding of discharge instructions.

## 2022-12-06 ENCOUNTER — TELEPHONE (OUTPATIENT)
Dept: OBGYN UNIT | Facility: HOSPITAL | Age: 32
End: 2022-12-06

## 2022-12-07 ENCOUNTER — TELEPHONE (OUTPATIENT)
Dept: OBGYN UNIT | Facility: HOSPITAL | Age: 32
End: 2022-12-07

## 2022-12-08 ENCOUNTER — NURSE ONLY (OUTPATIENT)
Dept: LACTATION | Facility: HOSPITAL | Age: 32
End: 2022-12-08
Attending: OBSTETRICS & GYNECOLOGY
Payer: COMMERCIAL

## 2022-12-08 PROCEDURE — 99213 OFFICE O/P EST LOW 20 MIN: CPT

## 2022-12-20 ENCOUNTER — NURSE ONLY (OUTPATIENT)
Dept: LACTATION | Facility: HOSPITAL | Age: 32
End: 2022-12-20
Attending: OBSTETRICS & GYNECOLOGY
Payer: COMMERCIAL

## 2022-12-20 PROCEDURE — 99212 OFFICE O/P EST SF 10 MIN: CPT

## 2023-03-06 ENCOUNTER — NURSE ONLY (OUTPATIENT)
Dept: LACTATION | Facility: HOSPITAL | Age: 33
End: 2023-03-06
Attending: STUDENT IN AN ORGANIZED HEALTH CARE EDUCATION/TRAINING PROGRAM
Payer: COMMERCIAL

## 2023-03-06 PROCEDURE — 99212 OFFICE O/P EST SF 10 MIN: CPT

## 2023-04-03 ENCOUNTER — ORDER TRANSCRIPTION (OUTPATIENT)
Dept: PHYSICAL THERAPY | Facility: HOSPITAL | Age: 33
End: 2023-04-03

## 2023-04-03 DIAGNOSIS — N94.10 DYSPAREUNIA, FEMALE: Primary | ICD-10-CM

## 2023-05-17 ENCOUNTER — TELEPHONE (OUTPATIENT)
Dept: PHYSICAL THERAPY | Facility: HOSPITAL | Age: 33
End: 2023-05-17

## 2023-05-19 ENCOUNTER — OFFICE VISIT (OUTPATIENT)
Dept: PHYSICAL THERAPY | Facility: HOSPITAL | Age: 33
End: 2023-05-19
Attending: STUDENT IN AN ORGANIZED HEALTH CARE EDUCATION/TRAINING PROGRAM
Payer: COMMERCIAL

## 2023-05-19 DIAGNOSIS — N94.10 DYSPAREUNIA, FEMALE: ICD-10-CM

## 2023-05-19 PROCEDURE — 97112 NEUROMUSCULAR REEDUCATION: CPT

## 2023-05-19 PROCEDURE — 97140 MANUAL THERAPY 1/> REGIONS: CPT

## 2023-05-19 PROCEDURE — 97161 PT EVAL LOW COMPLEX 20 MIN: CPT

## 2023-05-19 NOTE — PROGRESS NOTES
MUSCULOSKELETAL AND PELVIC FLOOR EVALUATION:     Diagnosis:   Dyspareunia, female (N94.10)   Referring Provider: Scar Strong  Date of Evaluation:    2023    Precautions:  None Next MD visit:   none scheduled  Date of Surgery: n/a     PATIENT SUMMARY   Mary Jane Childress is a 33year old female who presents to therapy today with complaints of pain with intercourse after giving birth to her son ~6 months ago. She tried to return to intercourse ~6 weeks after giving birth, and initially, pain was too intense to be able to continue. That has since improved, but she still has notable pain that reaches 4-5/10. Pain is most notable in the deeper layers of muscle initially and then it gradually dissipates. Pain quality described as \"raw. \" She did sustain 2nd degree tearing with childbirth including a urethral tear. She did also experience pain during a gynecological exam with a speculum recently. She denies constipation, urinary issues, or other PFM dysfunction. She does walk ~4 miles per day. Current symptoms include: vaginal pain    Pt describes pain level: current 0/10, best 0/10, worst 6-7/10 initially but has now reduced to 4-5/10. Quality: intermittent and sharp    Pregnant Now: No  Obstetrical/Gynecological history: : 1  Para: 1  Delivery method: vaginal  Complications in deliveries: 2nd degree tearing  Occupation/Activities: [employer]    PFDI-20: 17.71/300; Impairment= 5.9 %    Pritesh Contreras describes prior level of function as pain free with no limitations prior to childbirth. Pt goals include to resolve pain with intercourse. Past medical history was reviewed with Pritesh Contreras. Significant findings include  has a past medical history of ADD (attention deficit disorder).       URINARY HABITS  WNL    BOWEL HABITS  WNL    SEXUAL HEALTH STATUS  Marinoff Scale: 2  History of Sexual Abuse: No  Sexual [place] Status: Active  Pain with initial and/or deep penetration: Both  Pain with pelvic exam/tampon use: Yes    Juana Lopez presents to physical therapy evaluation with primary c/o pain with intercourse. The results of the objective tests and measures show mildly increased abdominal tension, chest dominant breathing pattern, presence of perineal scar tissue, and increased tension/pain in pelvic floor musculature  Functional deficits include but are not limited to impaired ability to tolerate a gynecological exam or intercourse due to tension and pain. Signs and symptoms are consistent with diagnosis of post-partum dyspareunia. Pt and PT discussed evaluation findings, pathology, POC and HEP. Pt voiced understanding and performs HEP correctly without reported pain. Skilled Pelvic Physical Therapy is medically necessary to address the above impairments and reach functional goals. OBJECTIVE:   Posture: Slight fwd head/rounded shoulders  Pelvic Alignment: Unremarkable  Deep Tendon Reflexes:  NT today  Gait: pt ambulates on level ground with normal mechanics. External Palpation: Mildly increased tension in lower abdomen  Scars (location/surgery): NA  Diastasis Recti: NT today    Range Of Motion  Lumbar AROM screen: WNL  LE AROM screen: grossly WNL     Strength (MMT) 5/5 RYDER LE except 3+/5 hip abductors  Transverse Abdominis: 4-/5    Flexibility Summary: WNL RYDER LE except mild deficits in adductors       Informed consent for internal pelvic evaluation given: Yes    External Observation:   Voluntary contraction: present   Voluntary relaxation: present but diminished  Involuntary contraction: present  Involuntary relaxation: absent    Mons pubis: WNL  Labia majora: WNL  Labia minora:  WNL  Urethral meatus: WNL  Introitus: WNL  Perineal body: other: increased tension/scar tissue      Internal Examination   Scar: scar tissue present at posterior introitus, mild TTP    Pelvic Floor Muscle strength: grossly 3+/5  External Anal Sphincter: NT  Accessory Muscle Use: none    Tissue Laxity Test:  Anterior Wall: WNL  Posterior Wall: WNL  Apical: WNL    Eccentric lengthening contraction: Impaired  Bearing down Valsalva maneuver (2-3x): WNL    Breathing pattern: chest dominant, decreased abdominal/thoracic expansion    Internal Palpation: WNL except Bulbocavernosus L>R minimal restriction and tenderness  Ischiocavernosus RYDER WNL  Deep Transverse Perineal RYDER minimal restriction  Compress Urethra/Sphincter R>L moderate restriction  Pubococcygeus/Pubovaginalis R>Lmoderate restriction and tenderness  Iliococcygeus R>L moderate restriction and tenderness  Coccygeus R>L moderate restriction and tenderness  Obturator Internus RYDER minimal restriction    Today's Treatment and Response:   Patient education was provided on objective findings of external and internal evaluation and expectations with treatment outcomes. Educated on pelvic anatomy and function with diagrams and pelvic model and diaphragmatic breathing for PNS activation and pelvic floor relaxation     Neuro:   Discussed PFM/pelvic organ anatomy and physiology, as well as physiological changes with pregnancy and childbirth. Discussed psychosocial aspects of dyspareunia and utility of down-training PFM via diaphragmatic breathing and lumbopelvic stretching exercise. Cued in diaphragmatic breathing in various positions with emphasis on pelvic floor elongation/relaxation. Also discussed dilator/pelvic wand use to alleviate PFM tension (encouraged patient to wait on purchasing set for now).  25 mins  Access Code: Aly Richey  Date: 05/19/2023  - Supine Pelvic Floor Stretch  - 1 x daily - 5 x weekly - 2 sets - 10 reps  - Diaphragmatic Breathing in Child's Pose with Pelvic Floor Relaxation  - 1 x daily - 5 x weekly - 2 sets - 10 reps  - Deep Squat with Pelvic Floor Relaxation  - 1 x daily - 5 x weekly - 10 reps  - Quadruped Diaphragmatic Breathing  - 1 x daily - 5 x weekly - 2 sets - 10 reps    Manual:   Internal assessment performed and cues provided on proper PFM contraction, relaxation, and bearing down. Brief TrP release applied to deep PFM layer on R, superficial PFM layer on L and perineal body. 15 mins        Charges: PT Eval Low Complexity, neuro x 2, manual x 1      Total Timed Treatment: 40 min     Total Treatment Time: 55 min     Based on clinical rationale and outcome measures, this evaluation involved Low Complexity decision making. PLAN OF CARE:    Goals: (to be met in 10 visits)  Patient will demonstrate optimal PFM elongation with coordinated inhalation x 10 reps to alleviate PFM pain and muscle tension. Patient will demonstrate normalized tone and minimal pain onset (</= 2/10) with internal assessment for increased tolerance to gynecological exam.   Patient will demonstrate optimal postural awareness without required cues for improved PFM excursion with diaphragmatic breathing. Patient will report adherence to HEP for continued exercise benefits following cessation of PT. Frequency / Duration: Patient will be seen for 1-2 x/week or a total of 10 visits over a 90 day period. Treatment will include: Manual Therapy, Neuromuscular Re-education, Self-Care Home Management, Therapeutic Activities, Therapeutic Exercise and Home Exercise Program instruction     Education or treatment limitation: None  Rehab Potential:excellent      Patient/Family/Caregiver was advised of these findings, precautions, and treatment options and has agreed to actively participate in planning and for this course of care. Thank you for your referral. Please co-sign or sign and return this letter via fax as soon as possible to [phone]. If you have any questions, please contact me at Dept: [phone]    Sincerely,  Electronically signed by therapist: Ritu Bonilla PT  [de-identified] certification required: Yes  I certify the need for these services furnished under this plan of treatment and while under my care.     X___________________________________________________ Date____________________    Certification From: 2/13/9353  To:8/17/2023

## 2023-05-26 ENCOUNTER — OFFICE VISIT (OUTPATIENT)
Dept: PHYSICAL THERAPY | Facility: HOSPITAL | Age: 33
End: 2023-05-26
Attending: STUDENT IN AN ORGANIZED HEALTH CARE EDUCATION/TRAINING PROGRAM
Payer: COMMERCIAL

## 2023-05-26 PROCEDURE — 97110 THERAPEUTIC EXERCISES: CPT

## 2023-05-26 PROCEDURE — 97140 MANUAL THERAPY 1/> REGIONS: CPT

## 2023-05-26 PROCEDURE — 97112 NEUROMUSCULAR REEDUCATION: CPT

## 2023-06-02 ENCOUNTER — OFFICE VISIT (OUTPATIENT)
Dept: PHYSICAL THERAPY | Facility: HOSPITAL | Age: 33
End: 2023-06-02
Attending: STUDENT IN AN ORGANIZED HEALTH CARE EDUCATION/TRAINING PROGRAM
Payer: COMMERCIAL

## 2023-06-02 PROCEDURE — 97140 MANUAL THERAPY 1/> REGIONS: CPT

## 2023-06-02 PROCEDURE — 97110 THERAPEUTIC EXERCISES: CPT

## 2023-06-07 ENCOUNTER — APPOINTMENT (OUTPATIENT)
Dept: PHYSICAL THERAPY | Facility: HOSPITAL | Age: 33
End: 2023-06-07
Attending: STUDENT IN AN ORGANIZED HEALTH CARE EDUCATION/TRAINING PROGRAM
Payer: COMMERCIAL

## 2023-06-16 ENCOUNTER — OFFICE VISIT (OUTPATIENT)
Dept: PHYSICAL THERAPY | Facility: HOSPITAL | Age: 33
End: 2023-06-16
Attending: STUDENT IN AN ORGANIZED HEALTH CARE EDUCATION/TRAINING PROGRAM
Payer: COMMERCIAL

## 2023-06-16 PROCEDURE — 97140 MANUAL THERAPY 1/> REGIONS: CPT

## 2023-06-16 PROCEDURE — 97110 THERAPEUTIC EXERCISES: CPT

## 2023-06-23 ENCOUNTER — OFFICE VISIT (OUTPATIENT)
Dept: PHYSICAL THERAPY | Facility: HOSPITAL | Age: 33
End: 2023-06-23
Attending: STUDENT IN AN ORGANIZED HEALTH CARE EDUCATION/TRAINING PROGRAM
Payer: COMMERCIAL

## 2023-06-23 PROCEDURE — 97110 THERAPEUTIC EXERCISES: CPT

## 2023-06-23 PROCEDURE — 97140 MANUAL THERAPY 1/> REGIONS: CPT

## 2023-06-30 ENCOUNTER — OFFICE VISIT (OUTPATIENT)
Dept: PHYSICAL THERAPY | Facility: HOSPITAL | Age: 33
End: 2023-06-30
Attending: STUDENT IN AN ORGANIZED HEALTH CARE EDUCATION/TRAINING PROGRAM
Payer: COMMERCIAL

## 2023-06-30 PROCEDURE — 97140 MANUAL THERAPY 1/> REGIONS: CPT

## 2023-06-30 PROCEDURE — 97110 THERAPEUTIC EXERCISES: CPT

## 2023-07-07 ENCOUNTER — APPOINTMENT (OUTPATIENT)
Dept: PHYSICAL THERAPY | Facility: HOSPITAL | Age: 33
End: 2023-07-07
Attending: STUDENT IN AN ORGANIZED HEALTH CARE EDUCATION/TRAINING PROGRAM
Payer: COMMERCIAL

## 2023-07-14 ENCOUNTER — OFFICE VISIT (OUTPATIENT)
Dept: PHYSICAL THERAPY | Facility: HOSPITAL | Age: 33
End: 2023-07-14
Attending: STUDENT IN AN ORGANIZED HEALTH CARE EDUCATION/TRAINING PROGRAM
Payer: COMMERCIAL

## 2023-07-14 PROCEDURE — 97140 MANUAL THERAPY 1/> REGIONS: CPT

## 2023-07-14 NOTE — PROGRESS NOTES
Adrianne  Pt has attended 7 visits in Physical Therapy. Diagnosis:   Dyspareunia, female (N94.10)    Referring Provider: Cory Gutierrez  Date of Evaluation:    5/19/23    Precautions:  None Next MD visit:   none scheduled  Date of Surgery: n/a   Insurance Primary/Secondary: [address] [place] [address] / N/A     # Auth Visits: 10          Subjective: Reports that she has max of 1/10 pain at this point. Reports 85% improvement since starting PT. Pain: 1/10 at worst with intercourse (no pain with internal work today)      Objective:   Posture: Slight fwd head/rounded shoulders  Pelvic Alignment: Unremarkable  Deep Tendon Reflexes:  NT today  Gait: pt ambulates on level ground with normal mechanics. External Palpation: Mildly increased tension in lower abdomen  Scars (location/surgery): NA  Diastasis Recti: NT today     Range Of Motion  Lumbar AROM screen: WNL  LE AROM screen: grossly WNL      Strength (MMT) 5/5 RYDER LE except 4-/5 hip abductors  Transverse Abdominis: 4/5     Flexibility Summary: WNL RYDER LE except mild deficits in adductors         Informed consent for internal pelvic evaluation given: Yes     External Observation:   Voluntary contraction: present   Voluntary relaxation: present  Involuntary contraction: present  Involuntary relaxation: absent     Mons pubis: WNL  Labia majora: WNL  Labia minora: WNL  Urethral meatus: WNL  Introitus: WNL  Perineal body: other: increased tension/scar tissue        Internal Examination   Scar: scar tissue present at posterior introitus, mild TTP     Pelvic Floor Muscle strength: grossly 4-/5  External Anal Sphincter: NT  Accessory Muscle Use: none     Tissue Laxity Test:  Anterior Wall: WNL  Posterior Wall: WNL  Apical: WNL     Eccentric lengthening contraction: Impaired  Bearing down Valsalva maneuver (2-3x): WNL     Breathing pattern: chest dominant, decreased abdominal/thoracic expansion     Internal Palpation: All WNL      Assessment:  At this time, patient has met all long term goals. She demonstrates normalized tone in all PF musculature with no reported tenderness to palpation. She additionally demonstrates markedly improved PFM excursion with coordinated inhalation. She also endorses adherence to HEP and plans to continue with exercises to maintain and build upon progress gained in PT. Plan to trial discharge at this time. Pt was advised to follow up with questions or concerns that arise; she verbalized understanding and agreement. Goals: (to be met in 10 visits)  Patient will demonstrate optimal PFM elongation with coordinated inhalation x 10 reps to alleviate PFM pain and muscle tension. MET. Patient will demonstrate normalized tone and minimal pain onset (</= 2/10) with internal assessment for increased tolerance to gynecological exam. MET. Patient will demonstrate optimal postural awareness without required cues for improved PFM excursion with diaphragmatic breathing. MET. Patient will report adherence to HEP for continued exercise benefits following cessation of PT. MET. PFDI-20: 17.71/300; Impairment= 5.9 %   PFDI-20: 4.17/300; impairment=1%      Patient/Family/Caregiver was advised of these findings, precautions, and treatment options and has agreed to actively participate in planning and for this course of care. Thank you for your referral. If you have any questions, please contact me at Dept: [phone].     Sincerely,  Electronically signed by therapist: Raquel Kinney, PT        Date: 5/26/2023  TX#: 2/10 Date: 6/2/23                TX#: 3/10 Date:  6/16/23               TX#: 4/10 Date: 6/23/23             TX#: 5/10 Date: 6/30/23  Tx#: 6/10 Date: 7/14/23  Tx#: 7/10   Neuro: 12'  Child's pose with side stretch and DB and tactile cues for posterior thoracic expansion x 10 B    Cat/cow stretch with coordinated DB x 20    Seated external PFM release with flexbar x 2 min B         Ther-ex: 16'  Hip flexion stretch with UE arc over x 10    Cat/cow with breathing x 20    Standing thread the needle x 10     Kneeling on airex with 3 way roll outs and DB x 2 mins    Hamstring stretches with DB x 1 min B   There-ex: 20'  Seated alternating hip rotation stretch x 10 B    Butterfly stretch with pelvic tilts x 15    1/2 kneeling stretch with breathing x 10 each side    Standing adductor stretch x 1 min B    Supine on SB with DB x 2 mins    Seated SB circles x 15 B    Seated SB pelvic tilts x 15    Seated SB lateral tilts x 15    Seated DB on SB (6 sec inhale/4 sec exhale) x 10       There-ex:15'  Hip ER rock back stretch 10 x 10 sec hold     Seated spine stretch 3 x 10 sec hold B    Half kneeling hip flexor stretch on foam x 1 min B    Adductor stretch with 1/2 kneeling x 1 min B    Open books on wall x 10 B    Added on to HEP There-ex: 12'  Supine backbend over ball with DB x 3 mins    Seated adductor stretch with rotation x 90 secs B    Seated overhead reach with DB x 2 mins    Standing overhead stretch with DB with ball x 2 mins    Standing thread the needle from elevated plinth x 6 B     There-ex:   Not today   Manual: 25'  Internal myofascial release to all three muscular layers bilaterally with sustained holds in deepest layer on R and perineal region  Manual: 25'  Internal myofascial release to all three muscular layers bilaterally with sustained holds in deepest layer bilaterally x 25 min    Manual: 25'  Internal myofascial release to all three muscular layers bilaterally with sustained holds in deepest layer bilaterally x 25 min  Manual: 25'  Internal myofascial release to all three muscular layers bilaterally  x 25 min  Manual: 28'  Internal myofascial release to all three muscular layers bilaterally  x 28 min  Manual: 25'  Internal myofascial release to all three muscular layers bilaterally/ internal reassessment x 25 min    There-ex: 13'    SL open books x 10 B    Quadruped thread the needle x 10 B    Standing inner thigh stretch x 1 min B    Standing flat back stretch with traction pull x 90 secs                     HEP: Access Code: Helayne Sack  Date: 05/19/2023  - Supine Pelvic Floor Stretch  - 1 x daily - 5 x weekly - 2 sets - 10 reps  - Diaphragmatic Breathing in Child's Pose with Pelvic Floor Relaxation  - 1 x daily - 5 x weekly - 2 sets - 10 reps  - Deep Squat with Pelvic Floor Relaxation  - 1 x daily - 5 x weekly - 10 reps  - Quadruped Diaphragmatic Breathing  - 1 x daily - 5 x weekly - 2 sets - 10 reps  Access Code: [email or web address]  Date: 05/26/2023  - Cat Cow  - 1 x daily - 5 x weekly - 2 sets - 10 reps  - Quadruped Full Range Thoracic Rotation with Reach  - 1 x daily - 5 x weekly - 2 sets - 10 reps  Access Code: [email or web address]  Date: 06/23/2023  - Kneeling Adductor Stretch with Hip External Rotation  - 1 x daily - 5 x weekly - 1 min hold  - Standing Thoracic Open Book at [address]  - 1 x daily - 5 x weekly - 10 reps  - Half Kneeling Hip Flexor Stretch  - 1 x daily - 5 x weekly - 1 min hold  - Posterior Pelvic Tilt at Wall Arms Overhead  - 1 x daily - 5 x weekly - 10 reps - 5 sec hold    Charges: manual x 2      Total Timed Treatment: 25 min  Total Treatment Time: 35 min

## 2023-07-21 ENCOUNTER — APPOINTMENT (OUTPATIENT)
Dept: PHYSICAL THERAPY | Facility: HOSPITAL | Age: 33
End: 2023-07-21
Attending: STUDENT IN AN ORGANIZED HEALTH CARE EDUCATION/TRAINING PROGRAM
Payer: COMMERCIAL

## 2023-11-11 LAB
ANTIBODY SCREEN OB: NEGATIVE
HEPATITIS B SURFACE ANTIGEN OB: NEGATIVE
HIV RESULT OB: NEGATIVE
RAPID PLASMA REAGIN OB: NONREACTIVE

## 2024-04-11 LAB — HIV RESULT OB: NEGATIVE

## 2024-06-08 LAB — STREP GP B CULT OB: NEGATIVE

## 2024-06-21 ENCOUNTER — TELEPHONE (OUTPATIENT)
Dept: OBGYN UNIT | Facility: HOSPITAL | Age: 34
End: 2024-06-21

## 2024-06-28 ENCOUNTER — APPOINTMENT (OUTPATIENT)
Dept: OBGYN CLINIC | Facility: HOSPITAL | Age: 34
End: 2024-06-28
Payer: COMMERCIAL

## 2024-06-28 ENCOUNTER — HOSPITAL ENCOUNTER (INPATIENT)
Facility: HOSPITAL | Age: 34
LOS: 2 days | Discharge: HOME | End: 2024-06-30
Attending: STUDENT IN AN ORGANIZED HEALTH CARE EDUCATION/TRAINING PROGRAM | Admitting: STUDENT IN AN ORGANIZED HEALTH CARE EDUCATION/TRAINING PROGRAM
Payer: COMMERCIAL

## 2024-06-28 ENCOUNTER — ANESTHESIA EVENT (OUTPATIENT)
Dept: OBGYN UNIT | Facility: HOSPITAL | Age: 34
End: 2024-06-28
Payer: COMMERCIAL

## 2024-06-28 ENCOUNTER — ANESTHESIA (OUTPATIENT)
Dept: OBGYN UNIT | Facility: HOSPITAL | Age: 34
End: 2024-06-28
Payer: COMMERCIAL

## 2024-06-28 PROBLEM — Z34.90 PREGNANCY (HCC): Status: ACTIVE | Noted: 2024-06-28

## 2024-06-28 LAB
ANTIBODY SCREEN: NEGATIVE
BASOPHILS # BLD AUTO: 0.08 X10(3) UL (ref 0–0.2)
BASOPHILS NFR BLD AUTO: 0.6 %
EOSINOPHIL # BLD AUTO: 0.13 X10(3) UL (ref 0–0.7)
EOSINOPHIL NFR BLD AUTO: 1 %
ERYTHROCYTE [DISTWIDTH] IN BLOOD BY AUTOMATED COUNT: 12.4 %
HCT VFR BLD AUTO: 40.1 %
HGB BLD-MCNC: 14.3 G/DL
IMM GRANULOCYTES # BLD AUTO: 0.17 X10(3) UL (ref 0–1)
IMM GRANULOCYTES NFR BLD: 1.3 %
LYMPHOCYTES # BLD AUTO: 3.14 X10(3) UL (ref 1–4)
LYMPHOCYTES NFR BLD AUTO: 23.4 %
MCH RBC QN AUTO: 34.6 PG (ref 26–34)
MCHC RBC AUTO-ENTMCNC: 35.7 G/DL (ref 31–37)
MCV RBC AUTO: 97.1 FL
MONOCYTES # BLD AUTO: 0.86 X10(3) UL (ref 0.1–1)
MONOCYTES NFR BLD AUTO: 6.4 %
NEUTROPHILS # BLD AUTO: 9.02 X10 (3) UL (ref 1.5–7.7)
NEUTROPHILS # BLD AUTO: 9.02 X10(3) UL (ref 1.5–7.7)
NEUTROPHILS NFR BLD AUTO: 67.3 %
PLATELET # BLD AUTO: 216 10(3)UL (ref 150–450)
RBC # BLD AUTO: 4.13 X10(6)UL
RH BLOOD TYPE: POSITIVE
T PALLIDUM AB SER QL IA: NONREACTIVE
WBC # BLD AUTO: 13.4 X10(3) UL (ref 4–11)

## 2024-06-28 PROCEDURE — 86901 BLOOD TYPING SEROLOGIC RH(D): CPT | Performed by: STUDENT IN AN ORGANIZED HEALTH CARE EDUCATION/TRAINING PROGRAM

## 2024-06-28 PROCEDURE — 0UQC7ZZ REPAIR CERVIX, VIA NATURAL OR ARTIFICIAL OPENING: ICD-10-PCS | Performed by: STUDENT IN AN ORGANIZED HEALTH CARE EDUCATION/TRAINING PROGRAM

## 2024-06-28 PROCEDURE — 0UQMXZZ REPAIR VULVA, EXTERNAL APPROACH: ICD-10-PCS | Performed by: STUDENT IN AN ORGANIZED HEALTH CARE EDUCATION/TRAINING PROGRAM

## 2024-06-28 PROCEDURE — 86900 BLOOD TYPING SEROLOGIC ABO: CPT | Performed by: STUDENT IN AN ORGANIZED HEALTH CARE EDUCATION/TRAINING PROGRAM

## 2024-06-28 PROCEDURE — 3E033VJ INTRODUCTION OF OTHER HORMONE INTO PERIPHERAL VEIN, PERCUTANEOUS APPROACH: ICD-10-PCS | Performed by: STUDENT IN AN ORGANIZED HEALTH CARE EDUCATION/TRAINING PROGRAM

## 2024-06-28 PROCEDURE — 86780 TREPONEMA PALLIDUM: CPT | Performed by: STUDENT IN AN ORGANIZED HEALTH CARE EDUCATION/TRAINING PROGRAM

## 2024-06-28 PROCEDURE — 85025 COMPLETE CBC W/AUTO DIFF WBC: CPT | Performed by: STUDENT IN AN ORGANIZED HEALTH CARE EDUCATION/TRAINING PROGRAM

## 2024-06-28 PROCEDURE — 0KQM0ZZ REPAIR PERINEUM MUSCLE, OPEN APPROACH: ICD-10-PCS | Performed by: STUDENT IN AN ORGANIZED HEALTH CARE EDUCATION/TRAINING PROGRAM

## 2024-06-28 PROCEDURE — 86850 RBC ANTIBODY SCREEN: CPT | Performed by: STUDENT IN AN ORGANIZED HEALTH CARE EDUCATION/TRAINING PROGRAM

## 2024-06-28 PROCEDURE — 10907ZC DRAINAGE OF AMNIOTIC FLUID, THERAPEUTIC FROM PRODUCTS OF CONCEPTION, VIA NATURAL OR ARTIFICIAL OPENING: ICD-10-PCS | Performed by: STUDENT IN AN ORGANIZED HEALTH CARE EDUCATION/TRAINING PROGRAM

## 2024-06-28 RX ORDER — MAGNESIUM CARB/ALUMINUM HYDROX 105-160MG
15 TABLET,CHEWABLE ORAL ONCE AS NEEDED
Status: DISCONTINUED | OUTPATIENT
Start: 2024-06-28 | End: 2024-06-28

## 2024-06-28 RX ORDER — SIMETHICONE 80 MG
80 TABLET,CHEWABLE ORAL 3 TIMES DAILY PRN
Status: DISCONTINUED | OUTPATIENT
Start: 2024-06-28 | End: 2024-06-30

## 2024-06-28 RX ORDER — ACETAMINOPHEN 500 MG
500 TABLET ORAL EVERY 6 HOURS PRN
Status: DISCONTINUED | OUTPATIENT
Start: 2024-06-28 | End: 2024-06-28

## 2024-06-28 RX ORDER — ACETAMINOPHEN 500 MG
1000 TABLET ORAL EVERY 6 HOURS PRN
Status: DISCONTINUED | OUTPATIENT
Start: 2024-06-28 | End: 2024-06-28

## 2024-06-28 RX ORDER — BUPIVACAINE HCL/0.9 % NACL/PF 0.25 %
5 PLASTIC BAG, INJECTION (ML) EPIDURAL AS NEEDED
Status: DISCONTINUED | OUTPATIENT
Start: 2024-06-28 | End: 2024-06-28

## 2024-06-28 RX ORDER — DEXTROSE, SODIUM CHLORIDE, SODIUM LACTATE, POTASSIUM CHLORIDE, AND CALCIUM CHLORIDE 5; .6; .31; .03; .02 G/100ML; G/100ML; G/100ML; G/100ML; G/100ML
INJECTION, SOLUTION INTRAVENOUS AS NEEDED
Status: DISCONTINUED | OUTPATIENT
Start: 2024-06-28 | End: 2024-06-28 | Stop reason: HOSPADM

## 2024-06-28 RX ORDER — SODIUM CHLORIDE, SODIUM LACTATE, POTASSIUM CHLORIDE, CALCIUM CHLORIDE 600; 310; 30; 20 MG/100ML; MG/100ML; MG/100ML; MG/100ML
INJECTION, SOLUTION INTRAVENOUS CONTINUOUS
Status: DISCONTINUED | OUTPATIENT
Start: 2024-06-28 | End: 2024-06-28 | Stop reason: HOSPADM

## 2024-06-28 RX ORDER — DOCUSATE SODIUM 100 MG/1
100 CAPSULE, LIQUID FILLED ORAL
Status: DISCONTINUED | OUTPATIENT
Start: 2024-06-28 | End: 2024-06-30

## 2024-06-28 RX ORDER — IBUPROFEN 600 MG/1
600 TABLET ORAL EVERY 6 HOURS
Status: DISCONTINUED | OUTPATIENT
Start: 2024-06-28 | End: 2024-06-30

## 2024-06-28 RX ORDER — ACETAMINOPHEN 500 MG
500 TABLET ORAL EVERY 6 HOURS PRN
Status: DISCONTINUED | OUTPATIENT
Start: 2024-06-28 | End: 2024-06-30

## 2024-06-28 RX ORDER — CITRIC ACID/SODIUM CITRATE 334-500MG
30 SOLUTION, ORAL ORAL AS NEEDED
Status: DISCONTINUED | OUTPATIENT
Start: 2024-06-28 | End: 2024-06-28 | Stop reason: HOSPADM

## 2024-06-28 RX ORDER — ACETAMINOPHEN 500 MG
1000 TABLET ORAL EVERY 6 HOURS PRN
Status: DISCONTINUED | OUTPATIENT
Start: 2024-06-28 | End: 2024-06-30

## 2024-06-28 RX ORDER — BISACODYL 10 MG
10 SUPPOSITORY, RECTAL RECTAL ONCE AS NEEDED
Status: DISCONTINUED | OUTPATIENT
Start: 2024-06-28 | End: 2024-06-30

## 2024-06-28 RX ORDER — ONDANSETRON 2 MG/ML
4 INJECTION INTRAMUSCULAR; INTRAVENOUS EVERY 6 HOURS PRN
Status: DISCONTINUED | OUTPATIENT
Start: 2024-06-28 | End: 2024-06-28 | Stop reason: HOSPADM

## 2024-06-28 RX ORDER — LIDOCAINE HYDROCHLORIDE AND EPINEPHRINE 15; 5 MG/ML; UG/ML
INJECTION, SOLUTION EPIDURAL AS NEEDED
Status: DISCONTINUED | OUTPATIENT
Start: 2024-06-28 | End: 2024-06-28 | Stop reason: SURG

## 2024-06-28 RX ORDER — ACETAMINOPHEN 160 MG
2000 TABLET,DISINTEGRATING ORAL DAILY
COMMUNITY

## 2024-06-28 RX ORDER — TERBUTALINE SULFATE 1 MG/ML
0.25 INJECTION, SOLUTION SUBCUTANEOUS AS NEEDED
Status: DISCONTINUED | OUTPATIENT
Start: 2024-06-28 | End: 2024-06-28 | Stop reason: HOSPADM

## 2024-06-28 RX ORDER — NALBUPHINE HYDROCHLORIDE 10 MG/ML
2.5 INJECTION, SOLUTION INTRAMUSCULAR; INTRAVENOUS; SUBCUTANEOUS
Status: DISCONTINUED | OUTPATIENT
Start: 2024-06-28 | End: 2024-06-28

## 2024-06-28 RX ORDER — IBUPROFEN 600 MG/1
600 TABLET ORAL ONCE AS NEEDED
Status: DISCONTINUED | OUTPATIENT
Start: 2024-06-28 | End: 2024-06-28 | Stop reason: HOSPADM

## 2024-06-28 RX ADMIN — LIDOCAINE HYDROCHLORIDE AND EPINEPHRINE 3 ML: 15; 5 INJECTION, SOLUTION EPIDURAL at 12:24:00

## 2024-06-28 RX ADMIN — LIDOCAINE HYDROCHLORIDE AND EPINEPHRINE 3 ML: 15; 5 INJECTION, SOLUTION EPIDURAL at 12:27:00

## 2024-06-28 NOTE — PROGRESS NOTES
MOM ADMISSION NOTE:  Report received and ID Bands checked & verified with: Odilia RUGGIERO  Patient admitted to room in stable condition via:     Oriented to room, safety precautions initiated, bed in low position, and call light in reach.   Plan of care and safety instructions reviewed, teaching sheets at bedside. VS and assessment initiated.

## 2024-06-28 NOTE — L&D DELIVERY NOTE
Husam Calvin [[id number]]      Labor Events     labor?: No   steroids?: None  Antibiotics received during labor?: No  Rupture date/time: 2024 0851     Rupture type: AROM  Fluid color: Clear  Labor type: Induced Onset of Labor  Induction: Oxytocin, AROM  Indications for induction: Elective  Intrapartum & labor complications: Variable decelerations, Late decelerations  Intrapartum & labor complications comment: Prolonged decelerations       Labor Event Times    Labor onset date/time: 2024 1053  Dilation complete date/time: 2024 1254       [place] Presentation    Presentation: Vertex  Position: Occiput Anterior       Operative Delivery    Operative Vaginal Delivery: No                Shoulder Dystocia    Shoulder Dystocia: No       Anesthesia    Method: Epidural               Delivery      Head delivery date/time: 2024 13:07:13   Delivery date/time:  24 13:07:20   Delivery type: Normal spontaneous vaginal delivery    Details:     Delivery location: delivery room       Delivery Providers    Delivering Clinician: Helen Monreal MD   Delivery personnel:  Provider Role   Gregoria Smith RN Baby Nurse   Coco Loaiza RN Delivery Nurse             Cord    Vessels: 3 Vessels  Complications: None  Timed cord clamping: Yes  Time in sec: 35  Cord blood disposition: to lab  Gases sent?: No       Resuscitation    Method: None        Measurements      Weight: 3520 g 7 lb 12.2 oz Length: 49.5 cm     Head circum.: 36 cm Chest circum.: 34.5 cm      Abdominal circum.: 33.5 cm           Placenta    Date/time: 2024 1310  Removal: Spontaneous  Appearance: Intact  Disposition: held for future pathology       Apgars    Living status: Living   Apgar Scoring Key:    0 1 2    Skin color Blue or pale Acrocyanotic Completely pink    Heart rate Absent <100 bpm >100 bpm    Reflex irritability No response Grimace Cry or active withdrawal    Muscle tone Limp Some flexion Active  motion    Respiratory effort Absent Weak cry; hypoventilation Good, crying              1 Minute:  5 Minute:  10 Minute:  15 Minute:  20 Minute:      Skin color: 1  1       Heart rate: 2  2       Reflex irritablity: 2  2       Muscle tone: 2  2       Respiratory effort: 2  2       Total: 9  9          Apgars assigned by: FATIMAH ALLEN  [place] disposition: with mother       Skin to Skin    Skin to skin initiated date/time: 2024 1324  Skin to skin with: Mother       Vaginal Count       Sponges   Sharps    Initial counts        Final counts 11   1    Final count RN: Coco Loaiza RN  Final count MD: Helen Monreal MD       Lacerations      Perineal lacerations: 2nd Repaired?: Yes     Labial laceration: bilateral Repaired?: Yes     Cervical laceration?: Yes Repaired?: Yes   Quantitative blood loss (mL): 259

## 2024-06-28 NOTE — ANESTHESIA PREPROCEDURE EVALUATION
PRE-OP EVALUATION    Patient Name: Shonda Calvin    Admit Diagnosis: pregnanacy  Pregnancy (HCC)    Pre-op Diagnosis: * No surgery found *        Anesthesia Procedure: LABOR ANALGESIA    * Surgery not found *    Pre-op vitals reviewed.  Temp: 97.8 °F (36.6 °C)  Pulse: 73  BP: 126/72  SpO2: 100 %  Body mass index is 24.96 kg/m².    Current medications reviewed.  Hospital Medications:   lactated ringers infusion   Intravenous Continuous    dextrose in lactated ringers 5% infusion   Intravenous PRN    lactated ringers IV bolus 500 mL  500 mL Intravenous PRN    acetaminophen (Tylenol Extra Strength) tab 500 mg  500 mg Oral Q6H PRN    acetaminophen (Tylenol Extra Strength) tab 1,000 mg  1,000 mg Oral Q6H PRN    ibuprofen (Motrin) tab 600 mg  600 mg Oral Once PRN    ondansetron (Zofran) 4 MG/2ML injection 4 mg  4 mg Intravenous Q6H PRN    oxyTOCIN in sodium chloride 0.9% (Pitocin) 30 Units/500mL infusion premix  62.5-900 luis angel-units/min Intravenous Continuous    terbutaline (Brethine) 1 MG/ML injection 0.25 mg  0.25 mg Subcutaneous PRN    sodium citrate-citric acid (Bicitra) 500-334 MG/5ML oral solution 30 mL  30 mL Oral PRN    oxyTOCIN in sodium chloride 0.9% (Pitocin) 30 Units/500mL infusion premix  0.5-20 luis angel-units/min Intravenous Continuous    [COMPLETED] lactated ringers IV bolus 1,000 mL  1,000 mL Intravenous Once    fentaNYL-bupivacaine 2 mcg/mL-0.125% in sodium chloride 0.9% 100 mL EPIDURAL infusion premix  12 mL/hr Epidural Continuous    fentaNYL (Sublimaze) 50 mcg/mL injection 100 mcg  100 mcg Epidural Once    EPHEDrine (PF) 25 MG/5 ML injection 5 mg  5 mg Intravenous PRN    nalbuphine (Nubain) 10 mg/mL injection 2.5 mg  2.5 mg Intravenous Q15 Min PRN    mineral oil oral liquid 15 mL  15 mL Oral Once PRN       Outpatient Medications:     Medications Prior to Admission   Medication Sig Dispense Refill Last Dose    cholecalciferol 50 MCG (2000 UT) Oral Cap Take 1 capsule (2,000 Units total) by mouth  daily.       prenatal vitamin with DHA 27-0.8-228 MG Oral Cap Take 1 capsule by mouth daily.          Allergies: Patient has no known allergies.      Anesthesia Evaluation    Patient summary reviewed.    Anesthetic Complications           GI/Hepatic/Renal    Negative GI/hepatic/renal ROS.                             Cardiovascular    Negative cardiovascular ROS.                                                   Endo/Other    Negative endo/other ROS.                              Pulmonary    Negative pulmonary ROS.                       Neuro/Psych    Negative neuro/psych ROS.                                  History reviewed. No pertinent surgical history.  Social History     Socioeconomic History    Marital status:    Tobacco Use    Smoking status: Never     Passive exposure: Never   Vaping Use    Vaping status: Never Used   Substance and Sexual Activity    Alcohol use: Never    Drug use: Never     History   Drug Use Unknown     Available pre-op labs reviewed.  Lab Results   Component Value Date    WBC 13.4 (H) 06/28/2024    RBC 4.13 06/28/2024    HGB 14.3 06/28/2024    HCT 40.1 06/28/2024    MCV 97.1 06/28/2024    MCH 34.6 (H) 06/28/2024    MCHC 35.7 06/28/2024    RDW 12.4 06/28/2024    .0 06/28/2024               Airway      Mallampati: II  Mouth opening: >3 FB  TM distance: > 6 cm  Neck ROM: full Cardiovascular    Cardiovascular exam normal.         Dental    Dentition appears grossly intact         Pulmonary    Pulmonary exam normal.                 Other findings              ASA: 2   Plan: epidural       Post-procedure pain management plan discussed with surgeon and patient.    Comment: Discussed risks of bleeding, infection, failed block, postdural puncture headache, nerve damage.   Plan/risks discussed with: patient                Present on Admission:  **None**

## 2024-06-28 NOTE — PLAN OF CARE
Problem: BIRTH - VAGINAL/ SECTION  Goal: Fetal and maternal status remain reassuring during the birth process  Description: INTERVENTIONS:  - Monitor vital signs  - Monitor fetal heart rate  - Monitor uterine activity  - Monitor labor progression (vaginal delivery)  - DVT prophylaxis (C/S delivery)  - Surgical antibiotic prophylaxis (C/S delivery)  Outcome: Progressing     Problem: PAIN - ADULT  Goal: Verbalizes/displays adequate comfort level or patient's stated pain goal  Description: INTERVENTIONS:  - Encourage pt to monitor pain and request assistance  - Assess pain using appropriate pain scale  - Administer analgesics based on type and severity of pain and evaluate response  - Implement non-pharmacological measures as appropriate and evaluate response  - Consider cultural and social influences on pain and pain management  - Manage/alleviate anxiety  - Utilize distraction and/or relaxation techniques  - Monitor for opioid side effects  - Notify MD/LIP if interventions unsuccessful or patient reports new pain  - Anticipate increased pain with activity and pre-medicate as appropriate  Outcome: Progressing     Problem: ANXIETY  Goal: Will report anxiety at manageable levels  Description: INTERVENTIONS:  - Administer medication as ordered  - Teach and rehearse alternative coping skills  - Provide emotional support with 1:1 interaction with staff  Outcome: Progressing     Problem: Patient/Family Goals  Goal: Patient/Family Long Term Goal  Description: Patient's Long Term Goal: To have a healthy infant    Interventions:  -   - See additional Care Plan goals for specific interventions  Outcome: Progressing  Goal: Patient/Family Short Term Goal  Description: Patient's Short Term Goal: Uncomplicated vaginal delivery    Interventions:   -   - See additional Care Plan goals for specific interventions  Outcome: Progressing

## 2024-06-28 NOTE — ANESTHESIA PROCEDURE NOTES
Labor Analgesia    Date/Time: 6/28/2024 12:12 PM    Performed by: Quynh Rooney MD  Authorized by: Quynh Rooney MD      General Information and Staff    Start Time:  6/28/2024 12:12 PM  End Time:  6/28/2024 12:23 PM  Anesthesiologist:  Quynh Rooney MD  Performed by:  Anesthesiologist  Patient Location:  OB  Site Identification: surface landmarks    Reason for Block: labor epidural    Preanesthetic Checklist: patient identified, IV checked, risks and benefits discussed, monitors and equipment checked, pre-op evaluation, timeout performed, IV bolus, anesthesia consent and sterile technique used      Procedure Details    Patient Position:  Sitting  Prep: ChloraPrep    Monitoring:  Heart rate and continuous pulse ox  Approach:  Midline    Epidural Needle    Injection Technique:  ALEXANDER saline  Needle Type:  Tuohy  Needle Gauge:  17 G  Needle Length:  3.375 in  Needle Insertion Depth:  5    Spinal Needle      Catheter    Catheter Type:  End hole  Catheter Size:  19 G  Catheter at Skin Depth:  10  Test Dose:  Negative    Assessment      Additional Comments

## 2024-06-28 NOTE — H&P
[facility]  Obstetrics Admission History & Physical    Shonda Calvin Patient Status:  Inpatient    1990 MRN [id number]   Location [facility] LABOR & DELIVERY Attending Helen Monreal MD   Hosp Day # 0 PCP No primary care provider on file.       HISTORY OF PRESENT ILLNESS:  The patient is a 33 year old  female at 39w5d Estimated Date of Delivery: 24, admitted for elective induction of labor. She has had an uncomplicated pregnancy.     PAST MEDICAL HISTORY:   Past Medical History:    ADD (attention deficit disorder)        PAST SURGICAL HISTORY:   History reviewed. No pertinent surgical history.     MEDICATIONS:     Medications Prior to Admission   Medication Sig Dispense Refill Last Dose    cholecalciferol 50 MCG ( UT) Oral Cap Take 1 capsule (2,000 Units total) by mouth daily.       prenatal vitamin with DHA 27-0.8-228 MG Oral Cap Take 1 capsule by mouth daily.           ALLERGIES:   No Known Allergies     SOCIAL HISTORY:     Social History     Tobacco Use    Smoking status: Never     Passive exposure: Never    Smokeless tobacco: Not on file   Substance Use Topics    Alcohol use: Never       FAMILY HISTORY:   Family History   Problem Relation Age of Onset    Breast Cancer Maternal Grandmother     Breast Cancer Maternal Aunt 50        GYNECOLOGICAL HISTORY:        Menarche 15yo. Menses every 28 days, lasting 3 days.  No history of an abnormal Pap smear.  No history of any sexually transmitted diseases.       OBSTETRIC HISTORY:    OB History    Para Term  AB Living   2 1 1     1   SAB IAB Ectopic Multiple Live Births         0 1      # Outcome Date GA Lbr Channing/2nd Weight Sex Type Anes PTL Lv   2 Current            1 Term 22 40w0d 10:35 / 00:59 8 lb 6 oz (3.8 kg) M NORMAL SPONT EPI N JOE      Complications: Variable decelerations        A positive, antibody screen negative  rubella immune  HBsAg: nonreactive  RPR nonreactive  Urine culture negative  HIV  negative  Hemoglobin electrophoresis normal adult pattern in 2022  GC/chlamydia negative  Parvo immune  Hepatitis C Ab nonreactive  Genetic screening: Panorama low risk Male  Horizon 27 carrier screen negative   AFP negative for open NTD  1hr   Hemoglobin 12.5 -> 12.1  Ferritin 33.5 -> 11.7   Antibody screen at 28 weeks negative    HIV at 28 weeks negative    RPR at 28 weeks nonreactive  Group B strep at 36 weeks negative     PHYSICAL EXAMINATION:      VITAL SIGNS:    /81   Pulse 86   Temp 97.8 °F (36.6 °C) (Oral)   Ht 5' 5\" (1.651 m)   Wt 145 lb (65.8 kg)   LMP 2023   BMI 24.13 kg/m²     Ultrasound 36w3d:  Estimated fetal weight at the 65th percentile, RWI 8 pounds 3 ounces.  cephalic, posterior placenta, ERIC 18.8cm    ABDOMEN:  Term uterus.  Cephalic.  Positive fetal heart tones.    PELVIC:  3/70/-2   Membranes: AROM with clear fluid     ASSESSMENT:    1. Intrauterine pregnancy, 39w5d.  2. Elective induction of labor     PLAN:  Recommendations discussed.  Risks and benefits discussed.  Routine admission orders.  Anticipate . Patient may have epidural.    Helen Monreal MD  2024

## 2024-06-28 NOTE — L&D DELIVERY NOTE
Vaginal Delivery Note          Shonda Calvin Patient Status:  Inpatient    1990 MRN [id number]   Location [facility] LABOR & DELIVERY Attending Helen Monreal MD   Hosp Day # 0 PCP No primary care provider on file.       Preprocedure Dx:  IUP at 39w5d, elective induction of labor, history of episiotomy for NRFHT    Post Procedure Dx: precipitous delivery    Procedure: Normal Spontaneous Vaginal Delivery    Physician: Helen Monreal MD    Anesthesia: Epidural    QBL: 258.5 cc    Findings:   1) A viable male infant with Apgars of 9 and 9 weighing 7lbs 12.2 oz was delivered in ROBYN position.   2) 3 vessel cord.   3) Normal appearing placenta spontaneously delivered intact.   4) bilateral labial lacerations, superficial cervical laceration at 12:00, and 2nd degree laceration    Procedure:  Patient is a 34 y/o  who presented at 39w5d gestation for elective induction of labor. Patient was admitted to labor and delivery. Her cervical exam was 3/70/-2. Pitocin was started per protocol. AROM was performed at 0851 with clear fluid. Epidural was placed per patient request. Her labor progressed from 4/70/-2 to 10/100/+2 in two hours. The patient pushed for approximately one contraction. As the head was delivered, the perineum was supported to decrease the risk of tearing.  The infant was delivered in the ROBYN position. The shoulders rotated easily, and the anterior shoulder delivered easily followed by the posterior shoulder and remainder of the infant. The infant's mouth and nose were bulb suctioned. The cord was doubly clamped and cut after a 35 second delay. The baby was placed on the mother's abdomen vigorous and crying.  The cord blood was collected, and the placenta spontaneously delivered intact shortly thereafter. Bimanual exam was performed to the fundus. Retained products were not palpated.    Examination of the cervix, vagina, and  perineum demonstrated a superficial cervical laceration at 12 o'clock, bilateral labial lacerations, and 2nd degree laceration. The cervical laceration was bleeding so it was repaired with a figure-of-eight stitch using 2-0 Vicryl. The right labial laceration was repaired in the same manner. The left labial laceration was repaired with running 2-0 Vicryl. The perineal body was re-approximated using 2-0 vicryl in an running fashion. The skin was re-approximated using 2-0 vicryl in a subcuticular fashion. Bleeding was minimal.  The patient was then moved to the supine position in stable condition.  Counts were correct.    Complications:  None    Helen Monreal MD  6/28/2024  1:39 PM

## 2024-06-28 NOTE — PLAN OF CARE
Problem: Patient/Family Goals  Goal: Patient/Family Short Term Goal  Description: Patient's Short Term Goal: Uncomplicated vaginal delivery    Interventions:   -   - See additional Care Plan goals for specific interventions  Outcome: Progressing     Problem: POSTPARTUM  Goal: Long Term Goal:Experiences normal postpartum course  Description: INTERVENTIONS:  - Assess and monitor vital signs and lab values.  - Assess fundus and lochia.  - Provide ice/sitz baths for perineum discomfort.  - Monitor healing of incision/episiotomy/laceration, and assess for signs and symptoms of infection and hematoma.  - Assess bladder function and monitor for bladder distention.  - Provide/instruct/assist with pericare as needed.  - Provide VTE prophylaxis as needed.  - Monitor bowel function.  - Encourage ambulation and provide assistance as needed.  - Assess and monitor emotional status and provide social service/psych resources as needed.  - Utilize standard precautions and use personal protective equipment as indicated. Ensure aseptic care of all intravenous lines and invasive tubes/drains.  - Obtain immunization and exposure to communicable diseases history.  Outcome: Progressing  Goal: Optimize infant feeding at the breast  Description: INTERVENTIONS:  - Initiate breast feeding within first hour after birth.   - Monitor effectiveness of current breast feeding efforts.  - Assess support systems available to mother/family.  - Identify cultural beliefs/practices regarding lactation, letdown techniques, maternal food preferences.  - Assess mother's knowledge and previous experience with breast feeding.  - Provide information as needed about early infant feeding cues (e.g., rooting, lip smacking, sucking fingers/hand) versus late cue of crying.  - Discuss/demonstrate breast feeding aids (e.g., infant sling, nursing footstool/pillows, and breast pumps).  - Encourage mother/other family members to express feelings/concerns, and actively  listen.  - Educate father/SO about benefits of breast feeding and how to manage common lactation challenges.  - Recommend avoidance of specific medications or substances incompatible with breast feeding.  - Assess and monitor for signs of nipple pain/trauma.  - Instruct and provide assistance with proper latch.  - Review techniques for milk expression (breast pumping) and storage of breast milk. Provide pumping equipment/supplies, instructions and assistance, as needed.  - Encourage rooming-in and breast feeding on demand.  - Encourage skin-to-skin contact.  - Provide LC support as needed.  - Assess for and manage engorgement.  - Provide breast feeding education handouts and information on community breast feeding support.   Outcome: Progressing  Goal: Establishment of adequate milk supply with medication/procedure interruptions  Description: INTERVENTIONS:  - Review techniques for milk expression (breast pumping).   - Provide pumping equipment/supplies, instructions, and assistance until it is safe to breastfeed infant.  Outcome: Progressing  Goal: Appropriate maternal -  bonding  Description: INTERVENTIONS:  - Assess caregiver- interactions.  - Assess caregiver's emotional status and coping mechanisms.  - Encourage caregiver to participate in  daily care.  - Assess support systems available to mother/family.  - Provide /case management support as needed.  Outcome: Progressing

## 2024-06-29 LAB
BASOPHILS # BLD AUTO: 0.08 X10(3) UL (ref 0–0.2)
BASOPHILS NFR BLD AUTO: 0.5 %
EOSINOPHIL # BLD AUTO: 0.07 X10(3) UL (ref 0–0.7)
EOSINOPHIL NFR BLD AUTO: 0.4 %
ERYTHROCYTE [DISTWIDTH] IN BLOOD BY AUTOMATED COUNT: 12.4 %
HCT VFR BLD AUTO: 38.2 %
HGB BLD-MCNC: 13.3 G/DL
IMM GRANULOCYTES # BLD AUTO: 0.12 X10(3) UL (ref 0–1)
IMM GRANULOCYTES NFR BLD: 0.7 %
LYMPHOCYTES # BLD AUTO: 2.8 X10(3) UL (ref 1–4)
LYMPHOCYTES NFR BLD AUTO: 17.2 %
MCH RBC QN AUTO: 34.5 PG (ref 26–34)
MCHC RBC AUTO-ENTMCNC: 34.8 G/DL (ref 31–37)
MCV RBC AUTO: 99 FL
MONOCYTES # BLD AUTO: 0.79 X10(3) UL (ref 0.1–1)
MONOCYTES NFR BLD AUTO: 4.8 %
NEUTROPHILS # BLD AUTO: 12.45 X10 (3) UL (ref 1.5–7.7)
NEUTROPHILS # BLD AUTO: 12.45 X10(3) UL (ref 1.5–7.7)
NEUTROPHILS NFR BLD AUTO: 76.4 %
PLATELET # BLD AUTO: 201 10(3)UL (ref 150–450)
RBC # BLD AUTO: 3.86 X10(6)UL
WBC # BLD AUTO: 16.3 X10(3) UL (ref 4–11)

## 2024-06-29 PROCEDURE — 85025 COMPLETE CBC W/AUTO DIFF WBC: CPT | Performed by: STUDENT IN AN ORGANIZED HEALTH CARE EDUCATION/TRAINING PROGRAM

## 2024-06-29 NOTE — PROGRESS NOTES
[facility]  Post-Partum Vaginal Delivery Progress Note    Shonda Calvin Patient Status:  Inpatient    1990 MRN [id number]   Location [facility] 2SW-J Attending Helen Monreal MD   Hosp Day # 1 PCP No primary care provider on file.     SUBJECTIVE:    Postpartum Day 1 s/p vaginal delivery    The patient is without complaints. Lochia appropriate. Pain is well controlled with current medications.     Baby is breast fed.      OBJECTIVE:    Vital signs in last 24 hours:  Temp:  [97.9 °F (36.6 °C)-98.3 °F (36.8 °C)] 98 °F (36.7 °C)  Pulse:  [] 65  Resp:  [16-17] 17  BP: (112-130)/(63-83) 112/77  SpO2:  [100 %] 100 %        Data Reviewed:  Recent Labs   Lab 24  0741 24  0814   RBC 4.13 3.86   HGB 14.3 13.3   HCT 40.1 38.2   MCV 97.1 99.0   MCH 34.6* 34.5*   MCHC 35.7 34.8   RDW 12.4 12.4   NEPRELIM 9.02* 12.45*   WBC 13.4* 16.3*   .0 201.0           ASSESSMENT:   Post Partum Day # 1 S/P Normal Spontaneous Vaginal Delivery doing well    PLAN:  Routine Post partum care.  Circ done  Probable Discharge home tomorrow in am.      Helen Monreal MD  2024  10:14 AM

## 2024-06-30 VITALS
DIASTOLIC BLOOD PRESSURE: 62 MMHG | OXYGEN SATURATION: 100 % | TEMPERATURE: 98 F | HEART RATE: 65 BPM | WEIGHT: 150 LBS | SYSTOLIC BLOOD PRESSURE: 97 MMHG | BODY MASS INDEX: 24.99 KG/M2 | RESPIRATION RATE: 16 BRPM | HEIGHT: 65 IN

## 2024-06-30 PROBLEM — Z34.90 PREGNANCY (HCC): Status: RESOLVED | Noted: 2024-06-28 | Resolved: 2024-06-30

## 2024-06-30 NOTE — PROGRESS NOTES
[facility]  Post-Partum Vaginal Delivery Progress Note    Shonda Calvin Patient Status:  Inpatient    1990 MRN [id number]   Location [facility] 2SW-J Attending Helen Monreal MD   Hosp Day # 2 PCP No primary care provider on file.     SUBJECTIVE:    Postpartum Day 2 s/p vaginal delivery    The patient was sleeping at the time of today's visit. She had no complaints yesterday and reported she desires early discharge today. Patient to notify RN if she has concerns prior to discharge.     OBJECTIVE:    Vital signs in last 24 hours:  Temp:  [98 °F (36.7 °C)] 98 °F (36.7 °C)  Pulse:  [63-65] 63  Resp:  [16-17] 16  BP: (112-115)/(76-77) 115/76        Data Reviewed:  Recent Labs   Lab 24  0741 24  0814   RBC 4.13 3.86   HGB 14.3 13.3   HCT 40.1 38.2   MCV 97.1 99.0   MCH 34.6* 34.5*   MCHC 35.7 34.8   RDW 12.4 12.4   NEPRELIM 9.02* 12.45*   WBC 13.4* 16.3*   .0 201.0           ASSESSMENT:   Post Partum Day # 2 S/P Normal Spontaneous Vaginal Delivery    PLAN:  Continue present management.  Discharge instructions given.  Follow up six weeks. Call as needed.  Home today.      Helen Monreal MD  2024  7:43 AM

## 2024-06-30 NOTE — PROGRESS NOTES
Discharge patient home as order. Teaching complete, patient feel comfortable in taking care of herself and  infant. Hugs off, send both mom and infant to their family car @ [phone].

## 2024-06-30 NOTE — DISCHARGE SUMMARY
Discussed condition and exacerbating conditions/situations (e.g., dry/arid environments, overhead fans, air conditioners, side effect of medications). Obstetrical Discharge Summary    Patient Name:  Shonda Calvin  MRN:                 [id number]  YOB: 1990    Primary OB Clinician: [facility]    Admission Date: 2024    Discharge Date:  2024    Admission Diagnosis: 34 yo  at 39 weeks presenting for elective induction of labor    Discharge Diagnoses: 33 year old  s/p vaginal delivery    Antepartum complications:   none    Intrapartum complications: None    Delivery: spontaneous vaginal delivery at 39w5d      Baby: Liveborn, male    Hospital Course:   The patient was admitted to L&D on 24 for elective induction of labor. The patient progressed well through her labor. On 24 at 1307, pt underwent uneventful spontaneous vaginal delivery with viable male infant. Apgars 9/9, weight 7 lb 12.2 oz. Please see delivery note for details.    Overall, pt did well in post-partum period. On postpartum day 1, patient was doing well. Pain was well-controlled. Lochia decreasing. She was tolerating a general diet. Denied nausea or vomiting. Patient denied any dysuria and she was ambulating well. Vital signs were stable. Physical exam was within normal limits.  Hemoglobin was stable. The patient continued to meet postpartum expectations. Pt was discharged home on post-partum day 2.    Recent Labs:  Recent Labs   Lab 24  0741 24  0814   RBC 4.13 3.86   HGB 14.3 13.3   HCT 40.1 38.2   MCV 97.1 99.0   MCH 34.6* 34.5*   MCHC 35.7 34.8   RDW 12.4 12.4   NEPRELIM 9.02* 12.45*   WBC 13.4* 16.3*   .0 201.0           Discharge condition:  D/C'ed home in stable condition    Discharge diet:  general    Discharge medications:       Medication List        CONTINUE taking these medications      cholecalciferol 50 MCG ( UT) Caps  Commonly known as: Vitamin D3     prenatal vitamin with DHA 27-0.8-228 MG Caps                Follow up: Follow-up in the office in 6 weeks.     Activity Restrictions:  Nothing per  vagina for 6 weeks (no sex, tampons or douching)    Pt understood all instructions and was given copy on discharge home.     Helen Monreal MD

## 2024-07-02 ENCOUNTER — TELEPHONE (OUTPATIENT)
Dept: OBGYN UNIT | Facility: HOSPITAL | Age: 34
End: 2024-07-02

## 2024-07-02 NOTE — PROGRESS NOTES
07/02/24 1424   Cradle Call Follow up   Cradle call follow up date 07/02/24   Follow up needed Completed   Hypertension or Preeclampsia during pregnancy or delivery No     Outgoing Cradle Call completed:    Mom reports that she and infant are doing well.  Baby has had a pediatrician F/U visit.   Reminded pt to schedule a postpartum follow up visit.   No complaints of PPD.   Reviewed basic self and infant care.   Encouraged to follow up w/ MD's w/ further question/concerns.

## 2024-07-09 ENCOUNTER — NURSE ONLY (OUTPATIENT)
Dept: LACTATION | Facility: HOSPITAL | Age: 34
End: 2024-07-09
Attending: STUDENT IN AN ORGANIZED HEALTH CARE EDUCATION/TRAINING PROGRAM
Payer: COMMERCIAL

## 2024-07-09 PROCEDURE — 99212 OFFICE O/P EST SF 10 MIN: CPT

## (undated) NOTE — LETTER
Patient Name: Anthony Galvez  YOB: 1990          MRN number:  [id number]  Date:  7/16/2023  Referring Physician:  Susan Alan        Dear Dr. Stephanie Honeycutt  Pt has attended 7 visits in Physical Therapy. Diagnosis:   Dyspareunia, female (N94.10)    Referring Provider: Yang Sánchez  Date of Evaluation:    5/19/23    Precautions:  None Next MD visit:   none scheduled  Date of Surgery: n/a   Insurance Primary/Secondary: [address] [place] [address] / N/A     # Auth Visits: 10          Subjective: Reports that she has max of 1/10 pain at this point. Reports 85% improvement since starting PT. Pain: 1/10 at worst with intercourse (no pain with internal work today)      Objective:   Posture: Slight fwd head/rounded shoulders  Pelvic Alignment: Unremarkable  Deep Tendon Reflexes:  NT today  Gait: pt ambulates on level ground with normal mechanics. External Palpation: Mildly increased tension in lower abdomen  Scars (location/surgery): NA  Diastasis Recti: NT today     Range Of Motion  Lumbar AROM screen: WNL  LE AROM screen: grossly WNL      Strength (MMT) 5/5 RYDER LE except 4-/5 hip abductors  Transverse Abdominis: 4/5     Flexibility Summary: WNL RYDER LE except mild deficits in adductors         Informed consent for internal pelvic evaluation given: Yes     External Observation:   Voluntary contraction: present   Voluntary relaxation: present  Involuntary contraction: present  Involuntary relaxation: absent     Mons pubis: WNL  Labia majora: WNL  Labia minora:  WNL  Urethral meatus: WNL  Introitus: WNL  Perineal body: other: increased tension/scar tissue        Internal Examination   Scar: scar tissue present at posterior introitus, mild TTP     Pelvic Floor Muscle strength: grossly 4-/5  External Anal Sphincter: NT  Accessory Muscle Use: none     Tissue Laxity Test:  Anterior Wall: WNL  Posterior Wall: WNL  Apical: WNL     Eccentric lengthening contraction: Impaired  Bearing down Valsalva maneuver (2-3x): WNL     Breathing pattern: chest dominant, decreased abdominal/thoracic expansion     Internal Palpation: All WNL      Assessment: At this time, patient has met all long term goals. She demonstrates normalized tone in all PF musculature with no reported tenderness to palpation. She additionally demonstrates markedly improved PFM excursion with coordinated inhalation. She also endorses adherence to HEP and plans to continue with exercises to maintain and build upon progress gained in PT. Plan to trial discharge at this time. Pt was advised to follow up with questions or concerns that arise; she verbalized understanding and agreement. Goals: (to be met in 10 visits)  Patient will demonstrate optimal PFM elongation with coordinated inhalation x 10 reps to alleviate PFM pain and muscle tension. MET. Patient will demonstrate normalized tone and minimal pain onset (</= 2/10) with internal assessment for increased tolerance to gynecological exam. MET. Patient will demonstrate optimal postural awareness without required cues for improved PFM excursion with diaphragmatic breathing. MET. Patient will report adherence to HEP for continued exercise benefits following cessation of PT. MET. PFDI-20: 17.71/300; Impairment= 5.9 %   PFDI-20: 4.17/300; impairment=1%      Patient/Family/Caregiver was advised of these findings, precautions, and treatment options and has agreed to actively participate in planning and for this course of care. Thank you for your referral. If you have any questions, please contact me at Dept: [phone]. Sincerely,  Electronically signed by therapist: Ritu Bonilla, PT         [facility] Notice to Patient: Medical documents like this are made available to patients in the interest of transparency. However, be advised this is a medical document and it is intended as [id number] communication between your medical providers.  This medical document may contain abbreviations, assessments, medical data, and results or other terms that are unfamiliar. Medical documents are intended to carry relevant information, facts as evident, and the clinical opinion of the practitioner. As such, this medical document may be written in language that appears blunt or direct. You are encouraged to contact your medical provider and/or [facility] Patient Experience if you have any questions about this medical document.